# Patient Record
Sex: FEMALE | Race: WHITE | NOT HISPANIC OR LATINO | Employment: FULL TIME | URBAN - METROPOLITAN AREA
[De-identification: names, ages, dates, MRNs, and addresses within clinical notes are randomized per-mention and may not be internally consistent; named-entity substitution may affect disease eponyms.]

---

## 2017-04-18 ENCOUNTER — APPOINTMENT (OUTPATIENT)
Dept: LAB | Facility: CLINIC | Age: 27
End: 2017-04-18
Payer: COMMERCIAL

## 2017-04-18 ENCOUNTER — ALLSCRIPTS OFFICE VISIT (OUTPATIENT)
Dept: OTHER | Facility: OTHER | Age: 27
End: 2017-04-18

## 2017-04-18 ENCOUNTER — TRANSCRIBE ORDERS (OUTPATIENT)
Dept: LAB | Facility: CLINIC | Age: 27
End: 2017-04-18

## 2017-04-18 ENCOUNTER — GENERIC CONVERSION - ENCOUNTER (OUTPATIENT)
Dept: OTHER | Facility: OTHER | Age: 27
End: 2017-04-18

## 2017-04-18 DIAGNOSIS — Z34.90 PRENATAL CARE, UNSPECIFIED TRIMESTER: Primary | ICD-10-CM

## 2017-04-18 DIAGNOSIS — Z34.90 ENCOUNTER FOR SUPERVISION OF NORMAL PREGNANCY: ICD-10-CM

## 2017-04-18 LAB
ABO GROUP BLD: NORMAL
BASOPHILS # BLD AUTO: 0.01 THOUSANDS/ΜL (ref 0–0.1)
BASOPHILS NFR BLD AUTO: 0 % (ref 0–1)
BILIRUB UR QL STRIP: NEGATIVE
BLD GP AB SCN SERPL QL: NEGATIVE
CLARITY UR: CLEAR
COLOR UR: YELLOW
EOSINOPHIL # BLD AUTO: 0.02 THOUSAND/ΜL (ref 0–0.61)
EOSINOPHIL NFR BLD AUTO: 0 % (ref 0–6)
ERYTHROCYTE [DISTWIDTH] IN BLOOD BY AUTOMATED COUNT: 12.1 % (ref 11.6–15.1)
GLUCOSE UR STRIP-MCNC: NEGATIVE MG/DL
HCT VFR BLD AUTO: 42.7 % (ref 34.8–46.1)
HGB BLD-MCNC: 14.4 G/DL (ref 11.5–15.4)
HGB UR QL STRIP.AUTO: NEGATIVE
KETONES UR STRIP-MCNC: NEGATIVE MG/DL
LEUKOCYTE ESTERASE UR QL STRIP: NEGATIVE
LYMPHOCYTES # BLD AUTO: 1.12 THOUSANDS/ΜL (ref 0.6–4.47)
LYMPHOCYTES NFR BLD AUTO: 12 % (ref 14–44)
MCH RBC QN AUTO: 31.7 PG (ref 26.8–34.3)
MCHC RBC AUTO-ENTMCNC: 33.7 G/DL (ref 31.4–37.4)
MCV RBC AUTO: 94 FL (ref 82–98)
MONOCYTES # BLD AUTO: 0.92 THOUSAND/ΜL (ref 0.17–1.22)
MONOCYTES NFR BLD AUTO: 10 % (ref 4–12)
NEUTROPHILS # BLD AUTO: 7.6 THOUSANDS/ΜL (ref 1.85–7.62)
NEUTS SEG NFR BLD AUTO: 78 % (ref 43–75)
NITRITE UR QL STRIP: NEGATIVE
PH UR STRIP.AUTO: 6.5 [PH] (ref 4.5–8)
PLATELET # BLD AUTO: 247 THOUSANDS/UL (ref 149–390)
PMV BLD AUTO: 10.9 FL (ref 8.9–12.7)
PROT UR STRIP-MCNC: NEGATIVE MG/DL
RBC # BLD AUTO: 4.54 MILLION/UL (ref 3.81–5.12)
RH BLD: POSITIVE
RUBV IGG SERPL IA-ACNC: 48.9 IU/ML
SP GR UR STRIP.AUTO: <=1.005 (ref 1–1.03)
SPECIMEN EXPIRATION DATE: NORMAL
UROBILINOGEN UR QL STRIP.AUTO: 0.2 E.U./DL
WBC # BLD AUTO: 9.67 THOUSAND/UL (ref 4.31–10.16)

## 2017-04-18 PROCEDURE — 81003 URINALYSIS AUTO W/O SCOPE: CPT

## 2017-04-18 PROCEDURE — 87086 URINE CULTURE/COLONY COUNT: CPT

## 2017-04-18 PROCEDURE — 86777 TOXOPLASMA ANTIBODY: CPT

## 2017-04-18 PROCEDURE — 86778 TOXOPLASMA ANTIBODY IGM: CPT

## 2017-04-18 PROCEDURE — 80307 DRUG TEST PRSMV CHEM ANLYZR: CPT

## 2017-04-18 PROCEDURE — 86787 VARICELLA-ZOSTER ANTIBODY: CPT

## 2017-04-18 PROCEDURE — 80081 OBSTETRIC PANEL INC HIV TSTG: CPT

## 2017-04-18 PROCEDURE — 36415 COLL VENOUS BLD VENIPUNCTURE: CPT

## 2017-04-19 LAB
BACTERIA UR CULT: NORMAL
CLINICAL COMMENT: NORMAL
HBV SURFACE AG SER QL: NORMAL
HIV 1+2 AB+HIV1 P24 AG SERPL QL IA: NORMAL
RPR SER QL: NORMAL
T GONDII IGG SERPL IA-ACNC: <3 IU/ML (ref 0–7.1)
T GONDII IGM SER IA-ACNC: <3 AU/ML (ref 0–7.9)

## 2017-04-20 LAB — VZV IGG SER IA-ACNC: NORMAL

## 2017-04-24 ENCOUNTER — HOSPITAL ENCOUNTER (OUTPATIENT)
Dept: ULTRASOUND IMAGING | Facility: HOSPITAL | Age: 27
Discharge: HOME | End: 2017-04-24
Attending: OBSTETRICS & GYNECOLOGY
Payer: COMMERCIAL

## 2017-04-24 DIAGNOSIS — Z34.90 PRENATAL CARE, UNSPECIFIED TRIMESTER: ICD-10-CM

## 2017-04-24 LAB
AMPHETAMINES UR QL SCN: NEGATIVE NG/ML
BARBITURATES UR QL SCN: NEGATIVE NG/ML
BENZODIAZ UR QL SCN: NEGATIVE NG/ML
BZE UR QL SCN: NEGATIVE NG/ML
CANNABINOIDS UR QL SCN: POSITIVE
METHADONE UR QL SCN: NEGATIVE NG/ML
OPIATES UR QL: NEGATIVE NG/ML
PCP UR QL: NEGATIVE NG/ML
PROPOXYPH UR QL: NEGATIVE NG/ML

## 2017-04-24 PROCEDURE — 76801 OB US < 14 WKS SINGLE FETUS: CPT

## 2017-04-26 ENCOUNTER — GENERIC CONVERSION - ENCOUNTER (OUTPATIENT)
Dept: OTHER | Facility: OTHER | Age: 27
End: 2017-04-26

## 2017-05-05 ENCOUNTER — GENERIC CONVERSION - ENCOUNTER (OUTPATIENT)
Dept: OTHER | Facility: OTHER | Age: 27
End: 2017-05-05

## 2017-05-24 ENCOUNTER — GENERIC CONVERSION - ENCOUNTER (OUTPATIENT)
Dept: OTHER | Facility: OTHER | Age: 27
End: 2017-05-24

## 2017-05-24 ENCOUNTER — LAB REQUISITION (OUTPATIENT)
Dept: LAB | Facility: HOSPITAL | Age: 27
End: 2017-05-24
Payer: COMMERCIAL

## 2017-05-24 DIAGNOSIS — Z34.90 ENCOUNTER FOR SUPERVISION OF NORMAL PREGNANCY: ICD-10-CM

## 2017-05-24 DIAGNOSIS — Z11.3 ENCOUNTER FOR SCREENING FOR INFECTIONS WITH PREDOMINANTLY SEXUAL MODE OF TRANSMISSION: ICD-10-CM

## 2017-05-24 PROCEDURE — 87591 N.GONORRHOEAE DNA AMP PROB: CPT | Performed by: OBSTETRICS & GYNECOLOGY

## 2017-05-24 PROCEDURE — 87491 CHLMYD TRACH DNA AMP PROBE: CPT | Performed by: OBSTETRICS & GYNECOLOGY

## 2017-05-26 LAB
CHLAMYDIA DNA CVX QL NAA+PROBE: NORMAL
N GONORRHOEA DNA GENITAL QL NAA+PROBE: NORMAL

## 2017-06-20 ENCOUNTER — GENERIC CONVERSION - ENCOUNTER (OUTPATIENT)
Dept: OTHER | Facility: OTHER | Age: 27
End: 2017-06-20

## 2017-06-20 DIAGNOSIS — Z87.898 PERSONAL HISTORY OF OTHER SPECIFIED CONDITIONS: ICD-10-CM

## 2017-07-06 ENCOUNTER — APPOINTMENT (OUTPATIENT)
Dept: LAB | Facility: CLINIC | Age: 27
End: 2017-07-06
Payer: COMMERCIAL

## 2017-07-06 ENCOUNTER — TRANSCRIBE ORDERS (OUTPATIENT)
Dept: LAB | Facility: CLINIC | Age: 27
End: 2017-07-06

## 2017-07-06 DIAGNOSIS — Z87.898 PERSONAL HISTORY OF OTHER SPECIFIED CONDITIONS: ICD-10-CM

## 2017-07-06 PROCEDURE — 80307 DRUG TEST PRSMV CHEM ANLYZR: CPT

## 2017-07-08 LAB
AMPHETAMINES UR QL SCN: NEGATIVE NG/ML
BARBITURATES UR QL SCN: NEGATIVE NG/ML
BENZODIAZ UR QL SCN: NEGATIVE NG/ML
BZE UR QL SCN: NEGATIVE NG/ML
CANNABINOIDS UR QL SCN: NEGATIVE NG/ML
METHADONE UR QL SCN: NEGATIVE NG/ML
OPIATES UR QL: NEGATIVE NG/ML
PCP UR QL: NEGATIVE NG/ML
PROPOXYPH UR QL: NEGATIVE NG/ML

## 2017-07-18 ENCOUNTER — ALLSCRIPTS OFFICE VISIT (OUTPATIENT)
Dept: OTHER | Facility: OTHER | Age: 27
End: 2017-07-18

## 2017-07-18 ENCOUNTER — GENERIC CONVERSION - ENCOUNTER (OUTPATIENT)
Dept: OTHER | Facility: OTHER | Age: 27
End: 2017-07-18

## 2017-08-01 ENCOUNTER — ALLSCRIPTS OFFICE VISIT (OUTPATIENT)
Dept: PERINATAL CARE | Facility: CLINIC | Age: 27
End: 2017-08-01
Payer: COMMERCIAL

## 2017-08-01 ENCOUNTER — GENERIC CONVERSION - ENCOUNTER (OUTPATIENT)
Dept: OTHER | Facility: OTHER | Age: 27
End: 2017-08-01

## 2017-08-01 PROCEDURE — 76817 TRANSVAGINAL US OBSTETRIC: CPT | Performed by: OBSTETRICS & GYNECOLOGY

## 2017-08-01 PROCEDURE — 76805 OB US >/= 14 WKS SNGL FETUS: CPT | Performed by: OBSTETRICS & GYNECOLOGY

## 2017-08-17 ENCOUNTER — GENERIC CONVERSION - ENCOUNTER (OUTPATIENT)
Dept: OTHER | Facility: OTHER | Age: 27
End: 2017-08-17

## 2017-09-13 ENCOUNTER — ALLSCRIPTS OFFICE VISIT (OUTPATIENT)
Dept: OTHER | Facility: OTHER | Age: 27
End: 2017-09-13

## 2017-09-13 ENCOUNTER — GENERIC CONVERSION - ENCOUNTER (OUTPATIENT)
Dept: OTHER | Facility: OTHER | Age: 27
End: 2017-09-13

## 2017-09-13 DIAGNOSIS — R73.09 OTHER ABNORMAL GLUCOSE: ICD-10-CM

## 2017-09-13 DIAGNOSIS — Z34.02 ENCOUNTER FOR SUPERVISION OF NORMAL FIRST PREGNANCY IN SECOND TRIMESTER: ICD-10-CM

## 2017-09-22 ENCOUNTER — TRANSCRIBE ORDERS (OUTPATIENT)
Dept: LAB | Facility: CLINIC | Age: 27
End: 2017-09-22

## 2017-09-22 ENCOUNTER — APPOINTMENT (OUTPATIENT)
Dept: LAB | Facility: CLINIC | Age: 27
End: 2017-09-22
Payer: COMMERCIAL

## 2017-09-22 DIAGNOSIS — Z34.02 ENCOUNTER FOR SUPERVISION OF NORMAL FIRST PREGNANCY IN SECOND TRIMESTER: ICD-10-CM

## 2017-09-22 LAB
BASOPHILS # BLD AUTO: 0.02 THOUSANDS/ΜL (ref 0–0.1)
BASOPHILS NFR BLD AUTO: 0 % (ref 0–1)
EOSINOPHIL # BLD AUTO: 0.03 THOUSAND/ΜL (ref 0–0.61)
EOSINOPHIL NFR BLD AUTO: 0 % (ref 0–6)
ERYTHROCYTE [DISTWIDTH] IN BLOOD BY AUTOMATED COUNT: 12.3 % (ref 11.6–15.1)
GLUCOSE 1H P 50 G GLC PO SERPL-MCNC: 136 MG/DL
HCT VFR BLD AUTO: 34.2 % (ref 34.8–46.1)
HGB BLD-MCNC: 11.4 G/DL (ref 11.5–15.4)
LYMPHOCYTES # BLD AUTO: 0.89 THOUSANDS/ΜL (ref 0.6–4.47)
LYMPHOCYTES NFR BLD AUTO: 8 % (ref 14–44)
MCH RBC QN AUTO: 30.6 PG (ref 26.8–34.3)
MCHC RBC AUTO-ENTMCNC: 33.3 G/DL (ref 31.4–37.4)
MCV RBC AUTO: 92 FL (ref 82–98)
MONOCYTES # BLD AUTO: 0.94 THOUSAND/ΜL (ref 0.17–1.22)
MONOCYTES NFR BLD AUTO: 9 % (ref 4–12)
NEUTROPHILS # BLD AUTO: 9.06 THOUSANDS/ΜL (ref 1.85–7.62)
NEUTS SEG NFR BLD AUTO: 83 % (ref 43–75)
PLATELET # BLD AUTO: 225 THOUSANDS/UL (ref 149–390)
PMV BLD AUTO: 9.9 FL (ref 8.9–12.7)
RBC # BLD AUTO: 3.72 MILLION/UL (ref 3.81–5.12)
WBC # BLD AUTO: 10.94 THOUSAND/UL (ref 4.31–10.16)

## 2017-09-22 PROCEDURE — 82950 GLUCOSE TEST: CPT

## 2017-09-22 PROCEDURE — 36415 COLL VENOUS BLD VENIPUNCTURE: CPT

## 2017-09-22 PROCEDURE — 85025 COMPLETE CBC W/AUTO DIFF WBC: CPT

## 2017-09-25 ENCOUNTER — GENERIC CONVERSION - ENCOUNTER (OUTPATIENT)
Dept: OTHER | Facility: OTHER | Age: 27
End: 2017-09-25

## 2017-09-26 ENCOUNTER — ALLSCRIPTS OFFICE VISIT (OUTPATIENT)
Dept: OTHER | Facility: OTHER | Age: 27
End: 2017-09-26

## 2017-09-26 ENCOUNTER — GENERIC CONVERSION - ENCOUNTER (OUTPATIENT)
Dept: OTHER | Facility: OTHER | Age: 27
End: 2017-09-26

## 2017-09-29 ENCOUNTER — APPOINTMENT (OUTPATIENT)
Dept: LAB | Facility: CLINIC | Age: 27
End: 2017-09-29
Payer: COMMERCIAL

## 2017-09-29 DIAGNOSIS — R73.09 OTHER ABNORMAL GLUCOSE: ICD-10-CM

## 2017-09-29 LAB
GLUCOSE 1H P 100 G GLC PO SERPL-MCNC: 157 MG/DL (ref 65–179)
GLUCOSE 2H P 100 G GLC PO SERPL-MCNC: 142 MG/DL (ref 65–154)
GLUCOSE 3H P 100 G GLC PO SERPL-MCNC: 112 MG/DL (ref 65–139)
GLUCOSE P FAST SERPL-MCNC: 88 MG/DL (ref 65–99)

## 2017-09-29 PROCEDURE — 36415 COLL VENOUS BLD VENIPUNCTURE: CPT

## 2017-09-29 PROCEDURE — 82948 REAGENT STRIP/BLOOD GLUCOSE: CPT

## 2017-09-29 PROCEDURE — 82951 GLUCOSE TOLERANCE TEST (GTT): CPT

## 2017-09-29 PROCEDURE — 82952 GTT-ADDED SAMPLES: CPT

## 2017-10-04 LAB — GLUCOSE SERPL-MCNC: 82 MG/DL (ref 65–140)

## 2017-10-05 ENCOUNTER — GENERIC CONVERSION - ENCOUNTER (OUTPATIENT)
Dept: OTHER | Facility: OTHER | Age: 27
End: 2017-10-05

## 2017-10-10 ENCOUNTER — GENERIC CONVERSION - ENCOUNTER (OUTPATIENT)
Dept: OTHER | Facility: OTHER | Age: 27
End: 2017-10-10

## 2017-10-25 ENCOUNTER — GENERIC CONVERSION - ENCOUNTER (OUTPATIENT)
Dept: OTHER | Facility: OTHER | Age: 27
End: 2017-10-25

## 2017-11-07 ENCOUNTER — GENERIC CONVERSION - ENCOUNTER (OUTPATIENT)
Dept: OTHER | Facility: OTHER | Age: 27
End: 2017-11-07

## 2017-11-21 ENCOUNTER — GENERIC CONVERSION - ENCOUNTER (OUTPATIENT)
Dept: OTHER | Facility: OTHER | Age: 27
End: 2017-11-21

## 2017-11-22 ENCOUNTER — LAB REQUISITION (OUTPATIENT)
Dept: LAB | Facility: HOSPITAL | Age: 27
End: 2017-11-22
Payer: COMMERCIAL

## 2017-11-22 DIAGNOSIS — Z34.83 ENCOUNTER FOR SUPERVISION OF OTHER NORMAL PREGNANCY, THIRD TRIMESTER: ICD-10-CM

## 2017-11-22 PROCEDURE — 87653 STREP B DNA AMP PROBE: CPT | Performed by: OBSTETRICS & GYNECOLOGY

## 2017-11-23 LAB — GP B STREP DNA SPEC QL NAA+PROBE: NORMAL

## 2017-11-29 ENCOUNTER — GENERIC CONVERSION - ENCOUNTER (OUTPATIENT)
Dept: OTHER | Facility: OTHER | Age: 27
End: 2017-11-29

## 2017-12-05 ENCOUNTER — GENERIC CONVERSION - ENCOUNTER (OUTPATIENT)
Dept: OTHER | Facility: OTHER | Age: 27
End: 2017-12-05

## 2017-12-11 ENCOUNTER — GENERIC CONVERSION - ENCOUNTER (OUTPATIENT)
Dept: OTHER | Facility: OTHER | Age: 27
End: 2017-12-11

## 2017-12-13 ENCOUNTER — HOSPITAL ENCOUNTER (OUTPATIENT)
Facility: HOSPITAL | Age: 27
Discharge: HOME | End: 2017-12-13
Attending: OBSTETRICS & GYNECOLOGY | Admitting: OBSTETRICS & GYNECOLOGY
Payer: COMMERCIAL

## 2017-12-13 ENCOUNTER — GENERIC CONVERSION - ENCOUNTER (OUTPATIENT)
Dept: OTHER | Facility: OTHER | Age: 27
End: 2017-12-13

## 2017-12-13 VITALS
WEIGHT: 176 LBS | HEART RATE: 88 BPM | TEMPERATURE: 98.4 F | SYSTOLIC BLOOD PRESSURE: 124 MMHG | DIASTOLIC BLOOD PRESSURE: 87 MMHG | HEIGHT: 64 IN | RESPIRATION RATE: 20 BRPM | BODY MASS INDEX: 30.05 KG/M2

## 2017-12-13 LAB
ALBUMIN SERPL BCP-MCNC: 2.4 G/DL (ref 3.5–5)
ALP SERPL-CCNC: 135 U/L (ref 46–116)
ALT SERPL W P-5'-P-CCNC: 13 U/L (ref 12–78)
ANION GAP SERPL CALCULATED.3IONS-SCNC: 8 MMOL/L (ref 4–13)
AST SERPL W P-5'-P-CCNC: 15 U/L (ref 5–45)
BACTERIA UR QL AUTO: NORMAL /HPF
BASOPHILS # BLD AUTO: 0.01 THOUSANDS/ΜL (ref 0–0.1)
BASOPHILS NFR BLD AUTO: 0 % (ref 0–1)
BILIRUB SERPL-MCNC: 0.27 MG/DL (ref 0.2–1)
BILIRUB UR QL STRIP: NEGATIVE
BUN SERPL-MCNC: 4 MG/DL (ref 5–25)
CALCIUM SERPL-MCNC: 8.9 MG/DL (ref 8.3–10.1)
CHLORIDE SERPL-SCNC: 108 MMOL/L (ref 100–108)
CLARITY UR: CLEAR
CO2 SERPL-SCNC: 20 MMOL/L (ref 21–32)
COLOR UR: YELLOW
CREAT SERPL-MCNC: 0.34 MG/DL (ref 0.6–1.3)
CREAT UR-MCNC: 30.6 MG/DL
EOSINOPHIL # BLD AUTO: 0.02 THOUSAND/ΜL (ref 0–0.61)
EOSINOPHIL NFR BLD AUTO: 0 % (ref 0–6)
ERYTHROCYTE [DISTWIDTH] IN BLOOD BY AUTOMATED COUNT: 13.2 % (ref 11.6–15.1)
GFR SERPL CREATININE-BSD FRML MDRD: 152 ML/MIN/1.73SQ M
GLUCOSE SERPL-MCNC: 71 MG/DL (ref 65–140)
GLUCOSE UR STRIP-MCNC: NEGATIVE MG/DL
HCT VFR BLD AUTO: 35.1 % (ref 34.8–46.1)
HGB BLD-MCNC: 12.1 G/DL (ref 11.5–15.4)
HGB UR QL STRIP.AUTO: ABNORMAL
HYALINE CASTS #/AREA URNS LPF: NORMAL /LPF
KETONES UR STRIP-MCNC: NEGATIVE MG/DL
LEUKOCYTE ESTERASE UR QL STRIP: NEGATIVE
LYMPHOCYTES # BLD AUTO: 1.06 THOUSANDS/ΜL (ref 0.6–4.47)
LYMPHOCYTES NFR BLD AUTO: 8 % (ref 14–44)
MCH RBC QN AUTO: 30.8 PG (ref 26.8–34.3)
MCHC RBC AUTO-ENTMCNC: 34.5 G/DL (ref 31.4–37.4)
MCV RBC AUTO: 89 FL (ref 82–98)
MONOCYTES # BLD AUTO: 0.97 THOUSAND/ΜL (ref 0.17–1.22)
MONOCYTES NFR BLD AUTO: 8 % (ref 4–12)
NEUTROPHILS # BLD AUTO: 10.83 THOUSANDS/ΜL (ref 1.85–7.62)
NEUTS SEG NFR BLD AUTO: 84 % (ref 43–75)
NITRITE UR QL STRIP: NEGATIVE
NON-SQ EPI CELLS URNS QL MICRO: NORMAL /HPF
NRBC BLD AUTO-RTO: 0 /100 WBCS
PH UR STRIP.AUTO: 7.5 [PH] (ref 4.5–8)
PLATELET # BLD AUTO: 199 THOUSANDS/UL (ref 149–390)
PMV BLD AUTO: 10.7 FL (ref 8.9–12.7)
POTASSIUM SERPL-SCNC: 3.6 MMOL/L (ref 3.5–5.3)
PROT SERPL-MCNC: 6.2 G/DL (ref 6.4–8.2)
PROT UR STRIP-MCNC: NEGATIVE MG/DL
PROT UR-MCNC: 12 MG/DL
PROT/CREAT UR: 0.39 MG/G{CREAT} (ref 0–0.1)
RBC # BLD AUTO: 3.93 MILLION/UL (ref 3.81–5.12)
RBC #/AREA URNS AUTO: NORMAL /HPF
SODIUM SERPL-SCNC: 136 MMOL/L (ref 136–145)
SP GR UR STRIP.AUTO: 1 (ref 1–1.03)
UROBILINOGEN UR QL STRIP.AUTO: 0.2 E.U./DL
WBC # BLD AUTO: 12.97 THOUSAND/UL (ref 4.31–10.16)
WBC #/AREA URNS AUTO: NORMAL /HPF

## 2017-12-13 PROCEDURE — 80053 COMPREHEN METABOLIC PANEL: CPT | Performed by: OBSTETRICS & GYNECOLOGY

## 2017-12-13 PROCEDURE — 99213 OFFICE O/P EST LOW 20 MIN: CPT

## 2017-12-13 PROCEDURE — 82570 ASSAY OF URINE CREATININE: CPT | Performed by: OBSTETRICS & GYNECOLOGY

## 2017-12-13 PROCEDURE — 85025 COMPLETE CBC W/AUTO DIFF WBC: CPT | Performed by: OBSTETRICS & GYNECOLOGY

## 2017-12-13 PROCEDURE — 81001 URINALYSIS AUTO W/SCOPE: CPT | Performed by: OBSTETRICS & GYNECOLOGY

## 2017-12-13 PROCEDURE — 84156 ASSAY OF PROTEIN URINE: CPT | Performed by: OBSTETRICS & GYNECOLOGY

## 2017-12-13 NOTE — PROGRESS NOTES
Triage Note - OB  Sue Campuzano 33 y o  female MRN: [id number]  Unit/Bed#: LD Triage 4 Encounter: [id number]    Chief Complaint:   Chief Complaint   Patient presents with   Ratna Fontanezer     sent from office/has complaint of frontal headache since 0 this am     EFRAIN: Estimated Date of Delivery: 17    HPI: 33 y o  female  at 36w3d presents for r/o pre-eclampsia  She was seen in the office today and was c/o a frontal headache, spotty vision, and RUQ discomfortant  However, while speaking with the patient in [facility] she states she has a history of migraine headache and the spotty vision occurred prior to onset of headache, reported as blurry vision in periphery  She also has numbness in her fingers right before the onset of headache  She states the headache has mostly subsided and denies any changes in vision, chest pain, sob, RUQ/epigastric pain, or sudden onset of increased swelling in face and hands  BP in tirage have been 120s-130s/80s  Denies vaginal bleeding, LOF, or ctx  She had her membranes stripped in office today    Vitals: Pulse 103, temperature 98 4 °F (36 9 °C), temperature source Oral, resp  rate 20, height 5' 4" (1 626 m), weight 79 8 kg (176 lb), currently breastfeeding  ,Body mass index is 30 21 kg/m²      Physical Exam  GEN: well developed and well nourished, alert, oriented times 3 and appears comfortable  Abd: soft, non tender and gravid  SVE: deferred    FHR: 120bpm, moderate variability, positive accels, no decels, reactive  Padroni: q3-5 with irritability    Labs:   Admission on 2017   Component Date Value    WBC 2017 12 97*    RBC 2017 3 93     Hemoglobin 2017 12 1     Hematocrit 2017 35 1     MCV 2017 89     MCH 2017 30 8     MCHC 2017 34 5     RDW 2017 13 2     MPV 2017 10 7     Platelets  199     nRBC 2017 0     Neutrophils Relative 2017 84*    Lymphocytes Relative 2017 8*    Monocytes Relative 12/13/2017 8     Eosinophils Relative 12/13/2017 0     Basophils Relative 12/13/2017 0     Neutrophils Absolute 12/13/2017 10 83*    Lymphocytes Absolute 12/13/2017 1 06     Monocytes Absolute 12/13/2017 0 97     Eosinophils Absolute 12/13/2017 0 02     Basophils Absolute 12/13/2017 0 01      Recent Results (from the past 12 hour(s))   CBC and differential    Collection Time: 12/13/17  6:05 PM   Result Value Ref Range    WBC 12 97 (H) 4 31 - 10 16 Thousand/uL    RBC 3 93 3 81 - 5 12 Million/uL    Hemoglobin 12 1 11 5 - 15 4 g/dL    Hematocrit 35 1 34 8 - 46 1 %    MCV 89 82 - 98 fL    MCH 30 8 26 8 - 34 3 pg    MCHC 34 5 31 4 - 37 4 g/dL    RDW 13 2 11 6 - 15 1 %    MPV 10 7 8 9 - 12 7 fL    Platelets 990 513 - 439 Thousands/uL    nRBC 0 /100 WBCs    Neutrophils Relative 84 (H) 43 - 75 %    Lymphocytes Relative 8 (L) 14 - 44 %    Monocytes Relative 8 4 - 12 %    Eosinophils Relative 0 0 - 6 %    Basophils Relative 0 0 - 1 %    Neutrophils Absolute 10 83 (H) 1 85 - 7 62 Thousands/µL    Lymphocytes Absolute 1 06 0 60 - 4 47 Thousands/µL    Monocytes Absolute 0 97 0 17 - 1 22 Thousand/µL    Eosinophils Absolute 0 02 0 00 - 0 61 Thousand/µL    Basophils Absolute 0 01 0 00 - 0 10 Thousands/µL   Comprehensive metabolic panel    Collection Time: 12/13/17  6:05 PM   Result Value Ref Range    Sodium 136 136 - 145 mmol/L    Potassium 3 6 3 5 - 5 3 mmol/L    Chloride 108 100 - 108 mmol/L    CO2 20 (L) 21 - 32 mmol/L    Anion Gap 8 4 - 13 mmol/L    BUN 4 (L) 5 - 25 mg/dL    Creatinine 0 34 (L) 0 60 - 1 30 mg/dL    Glucose 71 65 - 140 mg/dL    Calcium 8 9 8 3 - 10 1 mg/dL    AST 15 5 - 45 U/L    ALT 13 12 - 78 U/L    Alkaline Phosphatase 135 (H) 46 - 116 U/L    Total Protein 6 2 (L) 6 4 - 8 2 g/dL    Albumin 2 4 (L) 3 5 - 5 0 g/dL    Total Bilirubin 0 27 0 20 - 1 00 mg/dL    eGFR 152 ml/min/1 73sq m   UA (URINE) with reflex to Microscopic    Collection Time: 12/13/17  6:05 PM   Result Value Ref Range Color, UA Yellow     Clarity, UA Clear     Specific [place], UA 1 005 1 003 - 1 030    pH, UA 7 5 4 5 - 8 0    Leukocytes, UA Negative Negative    Nitrite, UA Negative Negative    Protein, UA Negative Negative mg/dl    Glucose, UA Negative Negative mg/dl    Ketones, UA Negative Negative mg/dl    Urobilinogen, UA 0 2 0 2, 1 0 E U /dl E U /dl    Bilirubin, UA Negative Negative    Blood, UA Small (A) Negative   Protein / creatinine ratio, urine    Collection Time: 17  6:05 PM   Result Value Ref Range    Creatinine, Ur 30 6 mg/dL    Protein Urine Random 12 mg/dL    Prot/Creat Ratio, Ur 0 39 (H) 0 00 - 0 10   Urine Microscopic    Collection Time: 17  6:05 PM   Result Value Ref Range    RBC, UA None Seen None Seen, 0-5 /hpf    WBC, UA None Seen None Seen, 0-5, 5-55, 5-65 /hpf    Epithelial Cells Occasional None Seen, Occasional /hpf    Bacteria, UA None Seen None Seen, Occasional /hpf    Hyaline Casts, UA None Seen None Seen /lpf         Lab, Imaging and other studies: I have personally reviewed pertinent reports  A/P: 33 y o  female  at 36w3d for r/o pre-clampsia  1)CBC  CMP, U/A, Urine protein/creatinine ratio: see above, pr/cr ratio 0 39  Monitor Bps: 120s-130s/80s  Pt asymptomatic, Headache resolved  2)Proteinuria  3) Discharge instructions given to patient and labor precautions reviewed  Pt advised to f/u in office on Friday      D/w Dr Justin Chambers MD  OBGYN, PGY-1  2017 7:53 PM

## 2017-12-14 NOTE — DISCHARGE INSTRUCTIONS
Preeclampsia   WHAT YOU NEED TO KNOW:   What is preeclampsia? Preeclampsia is a condition that can develop during week 20 or later of your pregnancy  Preeclampsia means you have high blood pressure and may have protein in your urine  Preeclampsia can cause mild to life-threatening health problems for you and your unborn baby  What are the signs and symptoms of preeclampsia? You may not have any symptoms  Severe preeclampsia may cause any of the following symptoms:  · Swollen face and hands    · A sudden weight gain of 5 pounds or more    · Headache    · Spotted or blurred vision     · Pain in your upper abdomen  What increases my risk for preeclampsia? · First pregnancy    · Pregnant with twins or multiples    · Personal or family history of preeclampsia or eclampsia    · Overweight    · Diabetes, high blood pressure, or kidney disease    · Age older than 40 years  How is preeclampsia diagnosed? · A blood pressure  of 140/90 mmHg or more for at least 2 readings may mean you have preeclampsia  Your blood pressure will need to be checked 1 to 2 times a week until your baby is born  · Blood tests  are done to check your liver and kidney function  You may need blood tests every week while you are pregnant  · Urine tests  are used to check for protein  You may need to give healthcare providers a urine sample at each visit  You may also need to collect your urine every time you urinate for 24 hours  How will my unborn baby be monitored? You may need to keep track of how often your baby moves or kicks over a certain amount of time  Ask your healthcare provider how to do kick counts and how often to do them  You may also need the following tests at each visit until your baby is born:  · A fetal biophysical profile  combines a nonstress test and an ultrasound of your unborn baby  The nonstress test measures changes in your baby's heartbeat when he moves   The ultrasound will show your baby's movement, growth, and how his breathing muscles are working  Healthcare providers can check the amount of fluid around your baby  The ultrasound will also show how well your baby's lungs are working  · An umbilical cord Doppler  checks blood flow through the umbilical cord  How is preeclampsia treated? · Medicines  may be given to lower your blood pressure, protect your organs, or prevent seizures  Low doses of aspirin after 12 weeks of pregnancy may be recommended if you are at high risk for preeclampsia  Aspirin may help prevent preeclampsia or problems that can happen from preeclampsia  Do not take aspirin unless directed by your healthcare provider  · Rest  as directed  Your healthcare provider may tell you to rest more often if you have mild symptoms of preeclampsia  Lie on your left side as often as you can  You may need complete bedrest if you have more severe symptoms  You may need to be in the hospital if your condition worsens  · Delivery  usually stops preeclampsia  Healthcare providers may deliver your baby right away if he is full-term (37 weeks or more)  He may need to be delivered early if you or the baby has life-threatening symptoms  What are the risks of preeclampsia? Your baby may not grow as he should and may need to be delivered early  Placental abruption can occur if the placenta pulls away from the uterus too soon  This condition is life-threatening for your baby  High blood pressure that is not controlled can lead to blood clots, kidney or liver failure, or stroke  Severe preeclampsia can cause seizures or coma  This condition is called eclampsia  Eclampsia is a life-threatening condition for you and your unborn baby  Call 911 for any of the following:   · You have a seizure  · You have severe abdominal pain with nausea and vomiting  When should I seek immediate care? · You develop a severe headache that does not go away      · You have blurred or spotted vision that does not go away     · You are bleeding from your vagina  · You have new or increased swelling in your face or hands  · You are urinating little or not at all  When should I contact my healthcare provider? · You are urinating less than usual      · You do not feel your baby's movements as often as usual     · You have questions or concerns about your condition or care  CARE AGREEMENT:   You have the right to help plan your care  Learn about your health condition and how it may be treated  Discuss treatment options with your caregivers to decide what care you want to receive  You always have the right to refuse treatment  The above information is an  only  It is not intended as medical advice for individual conditions or treatments  Talk to your doctor, nurse or pharmacist before following any medical regimen to see if it is safe and effective for you  © 2017 [address] [employer] Information is for End User's use only and may not be sold, redistributed or otherwise used for commercial purposes  All illustrations and images included in CareNotes® are the copyrighted property of [employer]  or [employer]

## 2017-12-18 ENCOUNTER — ANESTHESIA (INPATIENT)
Dept: LABOR AND DELIVERY | Facility: HOSPITAL | Age: 27
End: 2017-12-18
Payer: COMMERCIAL

## 2017-12-18 ENCOUNTER — HOSPITAL ENCOUNTER (INPATIENT)
Facility: HOSPITAL | Age: 27
LOS: 2 days | Discharge: HOME | End: 2017-12-20
Attending: OBSTETRICS & GYNECOLOGY | Admitting: OBSTETRICS & GYNECOLOGY
Payer: COMMERCIAL

## 2017-12-18 ENCOUNTER — ANESTHESIA EVENT (INPATIENT)
Dept: LABOR AND DELIVERY | Facility: HOSPITAL | Age: 27
End: 2017-12-18
Payer: COMMERCIAL

## 2017-12-18 DIAGNOSIS — Z3A.39 39 WEEKS GESTATION OF PREGNANCY: Primary | ICD-10-CM

## 2017-12-18 DIAGNOSIS — R80.9 PROTEINURIA: ICD-10-CM

## 2017-12-18 PROBLEM — O42.90 PREMATURE RUPTURE OF MEMBRANES: Status: ACTIVE | Noted: 2017-12-18

## 2017-12-18 LAB
ABO GROUP BLD: NORMAL
AMPHETAMINES SERPL QL SCN: NEGATIVE
BARBITURATES UR QL: NEGATIVE
BASOPHILS # BLD AUTO: 0.02 THOUSANDS/ΜL (ref 0–0.1)
BASOPHILS NFR BLD AUTO: 0 % (ref 0–1)
BENZODIAZ UR QL: NEGATIVE
BLD GP AB SCN SERPL QL: NEGATIVE
COCAINE UR QL: NEGATIVE
EOSINOPHIL # BLD AUTO: 0.03 THOUSAND/ΜL (ref 0–0.61)
EOSINOPHIL NFR BLD AUTO: 0 % (ref 0–6)
ERYTHROCYTE [DISTWIDTH] IN BLOOD BY AUTOMATED COUNT: 13.4 % (ref 11.6–15.1)
HCT VFR BLD AUTO: 35.8 % (ref 34.8–46.1)
HGB BLD-MCNC: 12.4 G/DL (ref 11.5–15.4)
LYMPHOCYTES # BLD AUTO: 0.89 THOUSANDS/ΜL (ref 0.6–4.47)
LYMPHOCYTES NFR BLD AUTO: 7 % (ref 14–44)
MCH RBC QN AUTO: 31.2 PG (ref 26.8–34.3)
MCHC RBC AUTO-ENTMCNC: 34.6 G/DL (ref 31.4–37.4)
MCV RBC AUTO: 90 FL (ref 82–98)
METHADONE UR QL: NEGATIVE
MONOCYTES # BLD AUTO: 1.06 THOUSAND/ΜL (ref 0.17–1.22)
MONOCYTES NFR BLD AUTO: 8 % (ref 4–12)
NEUTROPHILS # BLD AUTO: 10.5 THOUSANDS/ΜL (ref 1.85–7.62)
NEUTS SEG NFR BLD AUTO: 85 % (ref 43–75)
NRBC BLD AUTO-RTO: 0 /100 WBCS
OPIATES UR QL SCN: NEGATIVE
PCP UR QL: NEGATIVE
PLATELET # BLD AUTO: 214 THOUSANDS/UL (ref 149–390)
PMV BLD AUTO: 11.3 FL (ref 8.9–12.7)
RBC # BLD AUTO: 3.98 MILLION/UL (ref 3.81–5.12)
RH BLD: POSITIVE
SPECIMEN EXPIRATION DATE: NORMAL
THC UR QL: NEGATIVE
WBC # BLD AUTO: 12.58 THOUSAND/UL (ref 4.31–10.16)

## 2017-12-18 PROCEDURE — 86592 SYPHILIS TEST NON-TREP QUAL: CPT | Performed by: OBSTETRICS & GYNECOLOGY

## 2017-12-18 PROCEDURE — 86901 BLOOD TYPING SEROLOGIC RH(D): CPT | Performed by: OBSTETRICS & GYNECOLOGY

## 2017-12-18 PROCEDURE — 99213 OFFICE O/P EST LOW 20 MIN: CPT

## 2017-12-18 PROCEDURE — 85025 COMPLETE CBC W/AUTO DIFF WBC: CPT | Performed by: OBSTETRICS & GYNECOLOGY

## 2017-12-18 PROCEDURE — 86850 RBC ANTIBODY SCREEN: CPT | Performed by: OBSTETRICS & GYNECOLOGY

## 2017-12-18 PROCEDURE — 86900 BLOOD TYPING SEROLOGIC ABO: CPT | Performed by: OBSTETRICS & GYNECOLOGY

## 2017-12-18 PROCEDURE — 80307 DRUG TEST PRSMV CHEM ANLYZR: CPT | Performed by: STUDENT IN AN ORGANIZED HEALTH CARE EDUCATION/TRAINING PROGRAM

## 2017-12-18 RX ORDER — BUTORPHANOL TARTRATE 1 MG/ML
1 INJECTION, SOLUTION INTRAMUSCULAR; INTRAVENOUS ONCE
Status: COMPLETED | OUTPATIENT
Start: 2017-12-18 | End: 2017-12-18

## 2017-12-18 RX ORDER — OXYTOCIN/RINGER'S LACTATE 30/500 ML
1-30 PLASTIC BAG, INJECTION (ML) INTRAVENOUS
Status: DISCONTINUED | OUTPATIENT
Start: 2017-12-18 | End: 2017-12-19

## 2017-12-18 RX ORDER — ONDANSETRON 2 MG/ML
4 INJECTION INTRAMUSCULAR; INTRAVENOUS EVERY 6 HOURS PRN
Status: DISCONTINUED | OUTPATIENT
Start: 2017-12-18 | End: 2017-12-19

## 2017-12-18 RX ORDER — SODIUM CHLORIDE, SODIUM LACTATE, POTASSIUM CHLORIDE, CALCIUM CHLORIDE 600; 310; 30; 20 MG/100ML; MG/100ML; MG/100ML; MG/100ML
125 INJECTION, SOLUTION INTRAVENOUS CONTINUOUS
Status: DISCONTINUED | OUTPATIENT
Start: 2017-12-18 | End: 2017-12-19

## 2017-12-18 RX ADMIN — SODIUM CHLORIDE, SODIUM LACTATE, POTASSIUM CHLORIDE, AND CALCIUM CHLORIDE 125 ML/HR: .6; .31; .03; .02 INJECTION, SOLUTION INTRAVENOUS at 19:47

## 2017-12-18 RX ADMIN — Medication 11 MILLI-UNITS/MIN: at 23:21

## 2017-12-18 RX ADMIN — BUTORPHANOL TARTRATE 1 MG: 1 INJECTION, SOLUTION INTRAMUSCULAR; INTRAVENOUS at 19:50

## 2017-12-18 RX ADMIN — SODIUM CHLORIDE, SODIUM LACTATE, POTASSIUM CHLORIDE, AND CALCIUM CHLORIDE 125 ML/HR: .6; .31; .03; .02 INJECTION, SOLUTION INTRAVENOUS at 21:13

## 2017-12-18 RX ADMIN — Medication 2 MILLI-UNITS/MIN: at 16:43

## 2017-12-18 RX ADMIN — ROPIVACAINE HYDROCHLORIDE: 2 INJECTION, SOLUTION EPIDURAL; INFILTRATION at 21:14

## 2017-12-18 RX ADMIN — ONDANSETRON 4 MG: 2 INJECTION INTRAMUSCULAR; INTRAVENOUS at 19:46

## 2017-12-18 RX ADMIN — SODIUM CHLORIDE, SODIUM LACTATE, POTASSIUM CHLORIDE, AND CALCIUM CHLORIDE 125 ML/HR: .6; .31; .03; .02 INJECTION, SOLUTION INTRAVENOUS at 15:25

## 2017-12-18 NOTE — H&P
H & P- Obstetrics   Laurence Goldman 33 y o  female MRN: [id number]  Unit/Bed#: LD Triage 4 Encounter: [id number]    Assessment: 33 y o   at 39w6d admitted for induction of labor for premature rupture of membranes  SROM @ 1030, clear, grossly ruptured on examination  SVE 2-3/60/-2  Category I FHT, Irritability on tocometer  A + / GBS negative  Clinical EFW 3200g  History notable for anxiety disorder w/ history of suicide attempt    Plan:   · Admit  · CBC, RPR, Type & Screen  · Pitocin titration for labor induction  · Analgesia at maternal request  · Consider postpartum case management consult for social history    Dr Pau Gupta aware      Chief Complaint: leakage of fluid    HPI: Laurence Goldman is a 33 y o   with an EFRAIN of 2017, by Ultrasound at 39w6d who is being admitted for premature rupture of membrane  The patient reports leakage of fluid since 1030, clear  She denies contractions or vaginal bleeding  She reports good fetal movement  OB history notable for: Elevated 1hr GTT (136) with normal 3hour ; Proteinuria diagnosed 17 in Triage, when patient initially presented for headache & found to have P:C0 39, no elevated blood pressures  Past medical history notable for asthma & anxiety disorder with history of suicide attempt  Patient Active Problem List   Diagnosis    Premature rupture of membranes    39 weeks gestation of pregnancy    Proteinuria       Baby complications/comments: none    Review of Systems   Constitutional: Negative for chills and fever  Eyes: Negative for visual disturbance  Respiratory: Negative for shortness of breath  Cardiovascular: Negative for chest pain, palpitations and leg swelling  Gastrointestinal: Negative for abdominal pain, diarrhea, nausea and vomiting  Genitourinary: Negative for dysuria and flank pain  Neurological: Negative for dizziness and headaches         OB History    Para Term  AB Living   1             SAB TAB Ectopic Multiple Live Births                  # Outcome Date GA Lbr Olivier/2nd Weight Sex Delivery Anes PTL Lv   1 Current                   Past Medical History:   Diagnosis Date    Anxiety     Asthma     Chronic kidney disease     stones    Migraine     Mood disorder (HCC)     Panic anxiety syndrome     Suicide attempt     Varicella     childhood       Past Surgical History:   Procedure Laterality Date    SHOULDER SURGERY         Social History   Substance Use Topics    Smoking status: Former Smoker    Smokeless tobacco: Never Used    Alcohol use No       No Known Allergies    Prescriptions Prior to Admission   Medication    Prenatal Vit-Fe Fumarate-FA (PRENATAL VITAMIN PO)       Objective:  Temp:  [98 8 °F (37 1 °C)] 98 8 °F (37 1 °C)  HR:  [108] 108  Resp:  [20] 20  BP: (128)/(86) 128/86  Body mass index is 30 21 kg/m²       Physical Exam:  Gen: NAD, AAOx3, Pleasant & Cooperative  Cv: RRR, No M/R/G  Resp: CTAB, No W/R/R  Abdomen: Gravid, non-tender  Clinical EFW: 3200g  Ext: Symmetric, non-tender    SVE:  Dilation: 2-3  Effacement (%): 60  Station: -1  Cervical Characteristics: Mid-position, Moderate    FHT:  Baseline Rate: 125 bpm  Variability: Moderate 6-25 bpm  Accelerations: 15 x 15 or greater, With fetal movment, At variable times  Decelerations: None  FHR Category: Category I    TOCO:   Contraction Frequency (minutes): 3-4  Contraction Duration (seconds): 60-80  Contraction Quality: Mild    Lab Results   Component Value Date    WBC 12 58 (H) 12/18/2017    HGB 12 4 12/18/2017    HCT 35 8 12/18/2017     12/18/2017     Lab Results   Component Value Date     12/13/2017    K 3 6 12/13/2017     12/13/2017    CO2 20 (L) 12/13/2017    BUN 4 (L) 12/13/2017    CREATININE 0 34 (L) 12/13/2017    GLUCOSE 71 12/13/2017    AST 15 12/13/2017    ALT 13 12/13/2017     Prenatal Labs: reviewed     Blood type: A positive  Antibody: Negative  GBS: Negative  HIV: Negative  Rubella: Immune  VDRL/RPR: Non reactive  HBsAg: Negative  Chlamydia: Negative  Gonorrhea: Negative  Diabetes 1 hour screen: 136  Diabetes 3 hour screen: 88/157/142/112  Platelets: 027  Hgb: 12 4  >2 Midnights  INPATIENT     Signature/Title: Rolando Perez MD  Date: 12/18/2017  Time: 3:43 PM

## 2017-12-19 PROBLEM — O14.00 MILD PRE-ECLAMPSIA: Status: ACTIVE | Noted: 2017-12-18

## 2017-12-19 LAB
BASE EXCESS BLDCOA CALC-SCNC: -5.3 MMOL/L (ref 3–11)
BASE EXCESS BLDCOV CALC-SCNC: -3.7 MMOL/L (ref 1–9)
HCO3 BLDCOA-SCNC: 22.1 MMOL/L (ref 17.3–27.3)
HCO3 BLDCOV-SCNC: 20.5 MMOL/L (ref 12.2–28.6)
O2 CT VFR BLDCOA CALC: 14.7 ML/DL
OXYHGB MFR BLDCOA: 59.9 %
OXYHGB MFR BLDCOV: 79.6 %
PCO2 BLDCOA: 49.4 MM[HG] (ref 30–60)
PCO2 BLDCOV: 35.2 MM HG (ref 27–43)
PH BLDCOA: 7.27 [PH] (ref 7.23–7.43)
PH BLDCOV: 7.38 [PH] (ref 7.19–7.49)
PO2 BLDCOA: 26.8 MM HG (ref 5–25)
PO2 BLDCOV: 34.6 MM HG (ref 15–45)
RPR SER QL: NORMAL
SAO2 % BLDCOV: 18.9 ML/DL

## 2017-12-19 PROCEDURE — 0KQM0ZZ REPAIR PERINEUM MUSCLE, OPEN APPROACH: ICD-10-PCS | Performed by: OBSTETRICS & GYNECOLOGY

## 2017-12-19 PROCEDURE — 82805 BLOOD GASES W/O2 SATURATION: CPT | Performed by: OBSTETRICS & GYNECOLOGY

## 2017-12-19 RX ORDER — DOCUSATE SODIUM 100 MG/1
100 CAPSULE, LIQUID FILLED ORAL 2 TIMES DAILY
Status: DISCONTINUED | OUTPATIENT
Start: 2017-12-19 | End: 2017-12-20 | Stop reason: HOSPADM

## 2017-12-19 RX ORDER — DIAPER,BRIEF,INFANT-TODD,DISP
1 EACH MISCELLANEOUS AS NEEDED
Status: DISCONTINUED | OUTPATIENT
Start: 2017-12-19 | End: 2017-12-20 | Stop reason: HOSPADM

## 2017-12-19 RX ORDER — ONDANSETRON 2 MG/ML
4 INJECTION INTRAMUSCULAR; INTRAVENOUS EVERY 8 HOURS PRN
Status: DISCONTINUED | OUTPATIENT
Start: 2017-12-19 | End: 2017-12-20 | Stop reason: HOSPADM

## 2017-12-19 RX ORDER — OXYTOCIN/RINGER'S LACTATE 30/500 ML
250 PLASTIC BAG, INJECTION (ML) INTRAVENOUS CONTINUOUS
Status: ACTIVE | OUTPATIENT
Start: 2017-12-19 | End: 2017-12-19

## 2017-12-19 RX ORDER — SIMETHICONE 80 MG
80 TABLET,CHEWABLE ORAL 4 TIMES DAILY PRN
Status: DISCONTINUED | OUTPATIENT
Start: 2017-12-19 | End: 2017-12-20 | Stop reason: HOSPADM

## 2017-12-19 RX ORDER — IBUPROFEN 600 MG/1
600 TABLET ORAL EVERY 6 HOURS PRN
Status: DISCONTINUED | OUTPATIENT
Start: 2017-12-19 | End: 2017-12-20 | Stop reason: HOSPADM

## 2017-12-19 RX ORDER — DIPHENHYDRAMINE HCL 25 MG
25 TABLET ORAL EVERY 6 HOURS PRN
Status: DISCONTINUED | OUTPATIENT
Start: 2017-12-19 | End: 2017-12-20 | Stop reason: HOSPADM

## 2017-12-19 RX ORDER — OXYCODONE HYDROCHLORIDE AND ACETAMINOPHEN 5; 325 MG/1; MG/1
2 TABLET ORAL EVERY 4 HOURS PRN
Status: DISCONTINUED | OUTPATIENT
Start: 2017-12-19 | End: 2017-12-20 | Stop reason: HOSPADM

## 2017-12-19 RX ORDER — OXYCODONE HYDROCHLORIDE AND ACETAMINOPHEN 5; 325 MG/1; MG/1
1 TABLET ORAL EVERY 4 HOURS PRN
Status: DISCONTINUED | OUTPATIENT
Start: 2017-12-19 | End: 2017-12-20 | Stop reason: HOSPADM

## 2017-12-19 RX ORDER — ACETAMINOPHEN 325 MG/1
650 TABLET ORAL EVERY 6 HOURS PRN
Status: DISCONTINUED | OUTPATIENT
Start: 2017-12-19 | End: 2017-12-20 | Stop reason: HOSPADM

## 2017-12-19 RX ORDER — OXYTOCIN/RINGER'S LACTATE 30/500 ML
62.5 PLASTIC BAG, INJECTION (ML) INTRAVENOUS CONTINUOUS
Status: ACTIVE | OUTPATIENT
Start: 2017-12-19 | End: 2017-12-19

## 2017-12-19 RX ADMIN — DOCUSATE SODIUM 100 MG: 100 CAPSULE, LIQUID FILLED ORAL at 08:47

## 2017-12-19 RX ADMIN — Medication 62.5 MILLI-UNITS/MIN: at 04:21

## 2017-12-19 RX ADMIN — BENZOCAINE AND MENTHOL: 20; .5 SPRAY TOPICAL at 06:16

## 2017-12-19 RX ADMIN — IBUPROFEN 600 MG: 600 TABLET ORAL at 18:38

## 2017-12-19 RX ADMIN — WITCH HAZEL 1 PAD: 500 SOLUTION RECTAL; TOPICAL at 06:16

## 2017-12-19 RX ADMIN — DOCUSATE SODIUM 100 MG: 100 CAPSULE, LIQUID FILLED ORAL at 18:39

## 2017-12-19 RX ADMIN — IBUPROFEN 600 MG: 600 TABLET ORAL at 11:01

## 2017-12-19 RX ADMIN — Medication 62.5 MILLI-UNITS/MIN: at 05:01

## 2017-12-19 RX ADMIN — OXYCODONE HYDROCHLORIDE AND ACETAMINOPHEN 1 TABLET: 5; 325 TABLET ORAL at 23:33

## 2017-12-19 RX ADMIN — SODIUM CHLORIDE, SODIUM LACTATE, POTASSIUM CHLORIDE, AND CALCIUM CHLORIDE 125 ML/HR: .6; .31; .03; .02 INJECTION, SOLUTION INTRAVENOUS at 01:55

## 2017-12-19 NOTE — OB LABOR/OXYTOCIN SAFETY PROGRESS
Oxytocin Safety Progress Check Note - Harsh Sood 33 y o  female MRN: [id number]    Unit/Bed#: -01 Encounter: [id number]    Obstetric History       T0      L0     SAB0   TAB0   Ectopic0   Multiple0   Live Births0      Gestational Age: 37w0d  Dose (christa-units/min) Oxytocin: 12 christa-units/min  Contraction Frequency (minutes): 1 5-2  Contraction Quality: Strong  Tachysystole: No   Dilation: 10        Effacement (%): 100  Station: 1  Baseline Rate: 150 bpm  Fetal Heart Rate: 140 BPM  FHR Category: Category I     Oxytocin Safety Progress Check: Safety check completed    Notes/comments:   Patient feeling pressure  Will begin pushing    Position: Serge Pacheco MD 2017 12:30 AM

## 2017-12-19 NOTE — L&D DELIVERY NOTE
Obstetrician:  Dr Rui Mccoy  Assistant: Dr Nain Iverson    Pre-Delivery Diagnosis:   Term pregnancy  Single fetus  PROM  Maternal Exhaustion      Post-Delivery Diagnosis:  Same as above - Delivered by vacuum assisted vaginal delivery    Procedure:   1  vacuum extraction delivery  2  Repair of none and 2nd degree    Cord Gas:  Arterial pH/BE: 7 268/-5 3  Venous pH/BE: 7 383/-3 7    Indications for instrumental delivery:  Prolonged second stage of labor  Shortening of the second stage for maternal benefit, maternal exhaustion      Anesthesia: epidural    Estimated Blood Loss: 243    Complications:  None    Brief History and Labor Course:    Dee Severin is a 33 y o   at 40w0d weeks gestation who was admitted for SROM  Please see labor timeline for complete labor course  The patient was completely dilated at 00:30  She began to push at 00:17 and at 00:38, decision was made for patient to labor down for one hour  At 01:45, Patient began to push again  She had made progress in the second stage from +1 to +2/+3, and fetus was noted to be in OA position  After pushing from 01:45 until 03:30, patient began to express concerns about exhaustion  At this point we have discussed the different options of continuing pushing, vacuum extraction,  section  We have discussed risks and benefits of all, and risks of vacuum included maternal trauma to the vagina  , fetal scalp lacerations, increased risk of hemorrhage in the fetal brain, increased bruising on the fetal scalp  We had also discussed that if a vacuum extraction had the abandoned, she would still need a  section which may be more difficult with the that point with the fetal head at the +3 station  Patient and  had time to discuss, and decided to pursue a vacuum attempt a vaginal delivery  We had discussed the with on the maternal for in order to have successful delivery    Fetal weight was estimated to be 7 and half to 8 lb, with adequate room in the pelvis  Description of Delivery:   Due to Shortening of the second stage for maternal benefit for maternal exhaustion, vacuum assisted delivery was recommended after pushing for one hour and 45 minutes   The pt was consented verbally and agreed  NICU was notified  The fetus was noted to be in the OA position, +3-4 station  EFW 8  Bladder was drained  Anesthesia was adequate  The Kiwi cup was applied to the fetal median flexion point and centered over the sagittal sutures approx 3 cm anterior to the posterior fontanelle  There was no vaginal or cervical tissue at the margin  With the start of the next contraction, a vacuum seal was established to a max of 600mmHg and gentle traction was applied  Advancement of the fetal head was noted with gentle traction on the suction device  The vacuum was applied at 03:33 hours  1  3 pulls were performed  2  0 number of pop-offs occurred  3  0 number of re applications were performed  The vacuum was released upon  of the fetal head and delivery was performed thereafter  There was no nuchal chord  With the assistance of maternal expulsive efforts and downward traction of fetal head, the anterior shoulder was delivered without difficulty, followed by the remainder of the infant's body  The infant was placed on the mother's abdomen and the umbilical cord was doubly clamped and cut  The awaiting nurse resuscitator was there for further evaluation of the infant  Umbilical cord blood and umbilical artery and venous gases were collected  IV pitocin was started during the 3rd stage to control bleeding  Placenta was delivered with fundal massage and gentle traction on the cord with active management of the third stage of labor  Placenta delivered intact with a peripherally inserted 3-vessel cord  A bimanual was performed and the uterus was noted to be firm and contracted      Inspection of the perineum, vagina, labia, and urethra revealed none and 2nd degree  The laceration was repaired with 3-0  vicryl on a CT needle, using a separate stitch of the 2-0 vicryl suture for the crown stitch  Good hemostasis was noted  During delivery and repair, EBL was noted to 500cc  Cervix was visualized and noted to be hemostatic and fundus was firm  Pitocin was run wide, and patient will receive another bag of pitocin at 62 5cc/hour    Mother and baby are currently recovering in the LDR in stable condition  I was present for and participated in all key portions of the procedure  All sponge, needle and instrument counts were noted to be correct x2

## 2017-12-19 NOTE — DISCHARGE INSTRUCTIONS
Vaginal Delivery   WHAT YOU SHOULD KNOW:   A vaginal delivery is the birth of your baby through your vagina (birth canal)  AFTER YOU LEAVE:   Medicines:  · NSAIDs  help decrease swelling and pain or fever  This medicine is available with or without a doctor's order  NSAIDs can cause stomach bleeding or kidney problems in certain people  If you take blood thinner medicine, always ask your healthcare provider if NSAIDs are safe for you  Always read the medicine label and follow directions  · Take your medicine as directed  Call your healthcare provider if you think your medicine is not helping or if you have side effects  Tell him if you are allergic to any medicine  Keep a list of the medicines, vitamins, and herbs you take  Include the amounts, and when and why you take them  Bring the list or the pill bottles to follow-up visits  Carry your medicine list with you in case of an emergency  Follow up with your primary healthcare provider:  Most women need to return 6 weeks after a vaginal delivery  Ask about how to care for your wounds or stitches  Write down your questions so you remember to ask them during your visits  Activity:  Rest as much as possible  Try to keep all activities short  You may be able to do some exercise soon after you have your baby  Talk with your primary healthcare provider before you start exercising  If you work outside the home, ask when you can return to your job  Kegel exercises:  Kegel exercises may help your vaginal and rectal muscles heal faster  You can do Kegel exercises by tightening and relaxing the muscles around your vagina  Kegel exercises help make the muscles stronger  Breast care:  When your milk comes in, your breasts may feel full and hard  Ask how to care for your breasts, even if you are not breastfeeding  Constipation:  Do not try to push the bowel movement out if it is too hard   High-fiber foods, extra liquids, and regular exercise can help you prevent constipation  Examples of high-fiber foods are fruit and bran  Prune juice and water are good liquids to drink  Regular exercise helps your digestive system work  You may also be told to take over-the-counter fiber and stool softener medicines  Take these items as directed  Hemorrhoids:  Pregnancy can cause severe hemorrhoids  You may have rectal pain because of the hemorrhoids  Ask how to prevent or treat hemorrhoids  Perineum care: Your perineum is the area between your vagina and anus  Keep the area clean and dry to help it heal and to prevent infection  Wash the area gently with soap and water when you bathe or shower  Rinse your perineum with warm water when you use the toilet  Your primary healthcare provider may suggest you use a warm sitz bath to help decrease pain  A sitz bath is a bathtub or basin filled to hip level  Stay in the sitz bath for 20 to 30 minutes, or as directed  Vaginal discharge: You will have vaginal discharge, called lochia, after your delivery  The lochia is bright red the first day or two after the birth  By the fourth day, the amount decreases, and it turns red-brown  Use a sanitary pad rather than a tampon to prevent a vaginal infection  It is normal to have lochia up to 8 weeks after your baby is born  Monthly periods: Your period may start again within 7 to 12 weeks after your baby is born  If you are breastfeeding, it may take longer for your period to start again  You can still get pregnant again even though you do not have your monthly period  Talk with your primary healthcare provider about a birth control method that will be good for you if you do not want to get pregnant  Mood changes: Many new mothers have some kind of mood changes after delivery  Some of these changes occur because of lack of sleep, hormone changes, and caring for a new baby  Some mood changes can be more serious, such as postpartum depression   Talk with your primary healthcare provider if you feel unable to care for yourself or your baby  Sexual activity:  You may need to avoid sex for 6 to 7 weeks after you have your baby  You may notice you have a decreased desire for sex, or sex may be painful  You may need to use a vaginal lubricant (gel) to help make sex more comfortable  Contact your primary healthcare provider if:   · You have heavy vaginal bleeding that fills 1 or more sanitary pads in 1 hour  · You have a fever  · Your pain does not go away, or gets worse  · The skin between your vagina and rectum is swollen, warm, or red  · You have swollen, hard, or painful breasts  · You feel very sad or depressed  · You feel more tired than usual      · You have questions or concerns about your condition or care  Seek care immediately or call 911 if:   · You have pus or yellow drainage coming from your vagina or wound  · You are urinating very little, or not at all  · Your arm or leg feels warm, tender, and painful  It may look swollen and red  · You feel lightheaded, have sudden and worsening chest pain, or trouble breathing  You may have more pain when you take deep breaths or cough, or you may cough up blood  © 2014 [address] is for End User's use only and may not be sold, redistributed or otherwise used for commercial purposes  All illustrations and images included in CareNotes® are the copyrighted property of [employer]  or [employer]  The above information is an  only  It is not intended as medical advice for individual conditions or treatments  Talk to your doctor, nurse or pharmacist before following any medical regimen to see if it is safe and effective for you

## 2017-12-19 NOTE — ANESTHESIA PROCEDURE NOTES
Epidural Block    Patient location during procedure: OB  Start time: 12/18/2017 8:10 PM  Reason for block: procedure for pain, at surgeon's request and primary anesthetic  Staffing  Anesthesiologist: Sheila Crawford  Performed: anesthesiologist   Preanesthetic Checklist  Completed: patient identified, site marked, surgical consent, pre-op evaluation, timeout performed, IV checked, risks and benefits discussed and monitors and equipment checked  Epidural  Patient position: sitting  Prep: Betadine and site prepped and draped  Patient monitoring: continuous pulse ox and frequent blood pressure checks  Approach: midline  Location: lumbar (1-5)  Injection technique: FLETCHER air  Needle  Needle type: Tuohy   Needle gauge: 18 G  Catheter at skin depth: 9 cm  Test dose: negative and lidocaine 1 5% with epinephrine 1-to-200,000 (3ml)negative aspiration for CSF, negative aspiration for heme and no paresthesia on injection  patient tolerated the procedure well with no immediate complications  Additional Notes  Total 5ml lido 1 5% with 1:200K epi given with good effect

## 2017-12-19 NOTE — ANESTHESIA PREPROCEDURE EVALUATION
Review of Systems/Medical History    Chart reviewed  No history of anesthetic complications     Cardiovascular  Negative cardio ROS    Pulmonary  Asthma: seasonal/exercise induced , ,        GI/Hepatic    GERD ,        Kidney stones,        Endo/Other  Negative endo/other ROS      GYN  Currently pregnant ,          Hematology    No coagulation disorder ,    Musculoskeletal       Neurology   Psychology   Anxiety,            Physical Exam    Airway    Mallampati score: III  TM Distance: >3 FB  Neck ROM: full     Dental   No notable dental hx     Cardiovascular  Comment: Negative ROS,     Pulmonary      Other Findings        Anesthesia Plan  ASA Score- 2       Anesthesia Type- epidural with ASA Monitors  Additional Monitors:   Airway Plan:     Comment: Labor epidural, GA/spinal as needed  Induction- intravenous  Informed Consent- Anesthetic plan and risks discussed with patient

## 2017-12-19 NOTE — PROGRESS NOTES
Pt states she is very tired  Pt and dr Cy Beavers encouraged patient to labor down for one hour then restart pushing  Pt agrees to rest and labor down

## 2017-12-19 NOTE — OB LABOR/OXYTOCIN SAFETY PROGRESS
Oxytocin Safety Progress Check Note - Laurence Goldman 33 y o  female MRN: [id number]    Unit/Bed#: -01 Encounter: [id number]    Obstetric History       T0      L0     SAB0   TAB0   Ectopic0   Multiple0   Live Births0      Gestational Age: 37w11d  Dose (christa-units/min) Oxytocin: 11 christa-units/min  Contraction Frequency (minutes): 1 5-2 5  Contraction Quality: Strong  Tachysystole: No   Dilation: 9        Effacement (%): 100  Station: -1  Baseline Rate: 130 bpm  Fetal Heart Rate: 140 BPM  FHR Category: Category I     Oxytocin Safety Progress Check: Safety check completed    Notes/comments:   Patient comfortable s/p epidural   Continue management  Will recheck in 2 hours  Frequent position changes    Fetal position ROT          Yolanda Franks MD 2017 11:32 PM

## 2017-12-19 NOTE — LACTATION NOTE
This note was copied from a baby's chart  CONSULT - LACTATION  Baby Girl Stephanie Richarda 0 days female MRN: [id number]    [address] [place] [address] Room / Bed: (N)/(N) Encounter: [id number]    Maternal Information     MOTHER:  Barbie Bennett  Maternal Age: 33 y o    OB History: #: 1, Date: 17, Sex: Female, Weight: 3600 g (7 lb 15 oz), GA: 40w0d, Delivery: Vaginal, Vacuum (Extractor), Apgar1: 9, Apgar5: 9, Living: Living, Birth Comments: None       Lactation history:   Has patient previously breast fed: No   How long had patient previously breast fed:     Previous breast feeding complications:       Past Surgical History:   Procedure Laterality Date    DENTAL SURGERY N/A     SHOULDER SURGERY         Birth information:  YOB: 2016   Time of birth: 3:41 AM   Sex: female   Delivery type: Vaginal, Vacuum (Extractor)   Birth Weight: 3600 g (7 lb 15 oz)   Percent of Weight Change: 0%     Gestational Age: 37w0d   [unfilled]    Assessment     Breast and nipple assessment: nipple retracts into brast tissue at times     Assessment: normal assessment    Feeding assessment: latch difficulty (will attempt different methods of latching to find a way that infant can maintain latch for at least 15 minutes )  LATCH:  Latch: Grasps breast, tongue down, lips flanged, rhythmic sucking   Audible Swallowing: Spontaneous and intermittent (24 hours old)   Type of Nipple: Flat   Comfort (Breast/Nipple): Soft/non-tender   Hold (Positioning): Full assist, teach one side, mother does other, staff holds   [facility] Score: 8          Feeding recommendations:  breast feed on demand     Met with mother  Provided mother with Ready, Set, Baby booklet  Discussed Skin to Skin contact an benefits to mom and baby  Talked about the delay of the first bath until baby has adjusted  Spoke about the benefits of rooming in   Feeding on cue and what that means for recognizing infant's hunger  Avoidance of pacifiers for the first month discussed  Talked about exclusive breastfeeding for the first 6 months  Positioning and latch reviewed as well as showing images of other feeding positions  Discussed the properties of a good latch in any position  Reviewed hand/manual expression  Discussed s/s that baby is getting enough milk and some s/s that breastfeeding dyad may need further help  Gave information on common concerns, what to expect the first few weeks after delivery, preparing for other caregivers, and how partners can help  Resources for support also provided  Information on hand expression given  Discussed benefits of knowing how to manually express breast including stimulating milk supply, softening nipple for latch and evacuating breast in the event of engorgement  Encouraged MOB to call for assistance, questions, and concerns about breastfeeding  Extension provided      Aurelia Cheney RN 12/19/2017 12:58 PM

## 2017-12-19 NOTE — ANESTHESIA POSTPROCEDURE EVALUATION
Post-Op Assessment Note      CV Status:  Stable    Mental Status:  Alert    Hydration Status:  Stable    PONV Controlled:  None    Airway Patency:  Patent    Post Op Vitals Reviewed: Yes          Staff: Anesthesiologist       Comments: effects of epidural resolving appropriately, pt satisfied with care    Post-op block assessment: no complications        BP      Temp      Pulse    Resp      SpO2

## 2017-12-19 NOTE — DISCHARGE SUMMARY
Discharge Summary - Silvia Clark 33 y o  female MRN: [id number]    Unit/Bed#: -01 Encounter: [id number]    Admission Date: 2017     Discharge Date: 2017    Admitting Diagnosis:   1  Pregnancy at 40w0d  2  PROM  3  Asthma  4  Anxiety/depression with hx of suicide attempt    Discharge Diagnosis: same, delivered  5  Preeclampsia without severe features    Procedures: vacuum, outlet    Attending: Leatrice Osler, MD    Hospital Course:     Silvia Clark is a 33 y o   at 40w0d wks who was initially admitted for premature rupture of membranes  She was started on pitocin and progressed well  She received an epidural for analgesia  She progressed to complete  Secondary to maternal exhaustion with explusive efforts, decision was made to proceed with vacuum assisted delivery  She delivered a viable female  on 17 at   Weight  7lbs 15 oz via vacuum, outlet  Apgars were 9 (1 min) and 9 (5 min)   was transferred to  nursery  Patient tolerated the procedure well and was transferred to recovery in stable condition  Her post-partum course was complicated by preeclampsia without severe features  She has had new-onset hypertension <160/110 without symptoms  She had a documented protein:creatinine ratio from an L&D triage visit 17  Post partum blood pressure were within normal range  Her post-partum pain was well controlled with oral analgesics  On day of discharge, she was ambulating and able to reasonably perform all ADLs  She was voiding and had appropriate bowel function  Pain was well controlled  She was discharged home on post-partum day 1  without complications  Patient was instructed to follow up with her OB as an outpatient and was given appropriate warnings to call provider if she develops signs of infection or uncontrolled pain   Patient with h/o of anxiety and suicide attempt but currently denying suicidal or homicidal ideations pending case management consult  Complications: none apparent    Condition at discharge: good     Discharge instructions/Information to patient and family:   See after visit summary for information provided to patient and family  Provisions for Follow-Up Care:  See after visit summary for information related to follow-up care and any pertinent home health orders        Disposition: Home    Planned Readmission: No

## 2017-12-19 NOTE — SOCIAL WORK
Consults for "H/o depression w/ previous suicide attempt " and "Hx THC < THC  Mom UDS neg  Baby urine pending "     Per chart, mom and baby UDS negative  This is pt's 1st child  Per chart review, pt took 6 pills of  025mg Xanax (her own Rx) in suicide attempt on 8/5/15 after the death of her grandmother 7/5/15 and then her father kicked her out of the house that day  Pt was admitted to [facility] for treatment  Attempted to speak with pt but she was sleeping as she just delivered the baby this morning  CM to follow up

## 2017-12-19 NOTE — PROGRESS NOTES
Pt sitting up on edge of bed ultrasound removed unable to obtain accurate fetal heart tracing due to maternal position

## 2017-12-19 NOTE — OB LABOR/OXYTOCIN SAFETY PROGRESS
Oxytocin Safety Progress Check Note - Unique Bourne 33 y o  female MRN: [id number]    Unit/Bed#: -01 Encounter: [id number]    Obstetric History       T0      L0     SAB0   TAB0   Ectopic0   Multiple0   Live Births0      Gestational Age: 37w11d  Dose (christa-units/min) Oxytocin: 6 christa-units/min  Contraction Frequency (minutes): 3  Contraction Quality: Moderate  Tachysystole: No   Dilation: 5        Effacement (%): 80  Station: -1  Baseline Rate: 125 bpm  Fetal Heart Rate: 159 BPM  FHR Category: Category I     Oxytocin Safety Progress Check: Safety check completed    Notes/comments:   Patient still feels discomfort from blanchard, but otherwise feels comfortable from epidural    FHT has been category 1  She is making cervical change  Will recheck in two hours    D/W Dr Miguel Collier MD 2017 9:23 PM

## 2017-12-20 VITALS
TEMPERATURE: 98.3 F | DIASTOLIC BLOOD PRESSURE: 84 MMHG | RESPIRATION RATE: 20 BRPM | BODY MASS INDEX: 30.05 KG/M2 | HEART RATE: 86 BPM | WEIGHT: 176 LBS | HEIGHT: 64 IN | SYSTOLIC BLOOD PRESSURE: 118 MMHG

## 2017-12-20 RX ORDER — ACETAMINOPHEN 325 MG/1
325 TABLET ORAL EVERY 6 HOURS PRN
Qty: 30 TABLET | Refills: 0
Start: 2017-12-20 | End: 2018-02-12 | Stop reason: ALTCHOICE

## 2017-12-20 RX ORDER — IBUPROFEN 600 MG/1
600 TABLET ORAL EVERY 6 HOURS PRN
Qty: 30 TABLET | Refills: 0
Start: 2017-12-20 | End: 2018-02-12 | Stop reason: ALTCHOICE

## 2017-12-20 RX ADMIN — DOCUSATE SODIUM 100 MG: 100 CAPSULE, LIQUID FILLED ORAL at 08:11

## 2017-12-20 RX ADMIN — IBUPROFEN 600 MG: 600 TABLET ORAL at 10:33

## 2017-12-20 NOTE — SOCIAL WORK
CSWS spoke with patient as a follow up to CM attempted contact with patient yesterday  Patient has a history of a suicide attempt in 2015, after her grandparent's passing  She reports at the time she was depressed as well  She was admitted to [facility] for inpatient behavioral health after the suicide attempt  Since this time patient has not had suicidal thoughts  She denies depression or anxiety  She is not in current treatment  She has never seen outpatient mental health providers  She denies substance abuse issues  She states before she was pregnant she smoked marijuana  CSWS updated RN of the above information  Patient is to be discharged today

## 2017-12-20 NOTE — PLAN OF CARE
ALTERATION IN THE BREAST     Optimize infant feeding at the breast Progressing        DISCHARGE PLANNING     Discharge to home or other facility with appropriate resources Progressing        INADEQUATE LATCH, SUCK OR SWALLOW     Demonstrate ability to latch and sustain latch, audible swallowing and satiety Progressing        INFECTION - ADULT     Absence or prevention of progression during hospitalization Progressing     Absence of fever/infection during neutropenic period Progressing        Knowledge Deficit     Verbalizes understanding of labor plan Progressing     Patient/family/caregiver demonstrates understanding of disease process, treatment plan, medications, and discharge instructions Progressing        Labor & Delivery     Manages discomfort Progressing     Patient vital signs are stable Progressing        PAIN - ADULT     Verbalizes/displays adequate comfort level or baseline comfort level Progressing        SAFETY ADULT     Patient will remain free of falls Progressing     Maintain or return to baseline ADL function Progressing     Maintain or return mobility status to optimal level Progressing

## 2017-12-20 NOTE — LACTATION NOTE
This note was copied from a baby's chart  Met with mother to go over feeding log since birth for the first week  Emphasized 8 or more (12) feedings in a 24 hour period, what to expect for the number of diapers per day of life and the progression of properties of the  stooling pattern  Discussed s/s that breastfeeding is going well after day 4 and when to get help from a pediatrician or lactation support person after day 4  Booklet included Breast Pumping Instructions, When You Go Back to Work or School, and Breastfeeding Resources for after discharge including access to the number for the [employer]  C/O sore nipples  Information given about sore nipples and how to correct with positioning techniques  Discussed maneuvers to latch infant on properly to avoid nipple pain and promote healing  Discussed treatments that could be utilized to promote healing  Hydro gel dressings given with instruction and verbalization of understanding of cleaning and duration of use  Encoraged MOB and FOB to call for assistance, questions and concerns  Extension number for inpatient lactation support provided

## 2017-12-20 NOTE — PROGRESS NOTES
Progress Note - OB/GYN   Namita Santiago 33 y o  female MRN: [id number]  Unit/Bed#: -01 Encounter: [id number]    Assessment:  33 y o   s/p Vacuum Vaginal Delivery Postpartum day  1  Preeclampsia w/o Sf: -140s/60-90s  Asthma- stable  H/o depression/ anxiety and previous suicide attempt- Case management consult in  Patient recovering well, Stable  Desires discharge today    Plan:  Continue routine post partum care  Pain management PRN  Follow case management consultation  Recommend blood pressure check outpatient in 1 week  Encourage ambulation  Encourage breastfeeding      Subjective/Objective   Chief Complaint:    Postpartum state    Subjective:   Patient doing well this AM seen with baby at bedside  She desires discharge today    Pain: yes, cramping, improved with meds  Tolerating PO: yes  Voiding: yes  Flatus: yes  BM: no  Ambulating: yes  Breastfeeding:  yes  Chest pain: no  Shortness of breath: no  Leg pain: no  Lochia: minimal    Objective:     Vitals: Temp:  [97 4 °F (36 3 °C)-98 3 °F (36 8 °C)] 97 4 °F (36 3 °C)  HR:  [] 86  Resp:  [18-20] 18  BP: (102-144)/(62-97) 102/62       Intake/Output Summary (Last 24 hours) at 17 5563  Last data filed at 17 0492   Gross per 24 hour   Intake                0 ml   Output              600 ml   Net             -600 ml         Physical Exam:   General: NAD, alert, oriented  Cardio: Regular rate and rhythm, no murmur  Resp: nonlabored breathing, clear to auscultation bilaterally  Abdomen: Soft, no distension/rebound/guarding/tenderness   Fundus: Firm, non-tender, fundus: 1cm below umbilicus  G/U: minimal lochia noted on pad  Lower Extremities: Non-tender, no palpable cords    Medications:  Current Facility-Administered Medications   Medication Dose Route Frequency    acetaminophen (TYLENOL) tablet 650 mg  650 mg Oral Q6H PRN    benzocaine-menthol-lanolin-aloe (DERMOPLAST) 20-0 5 % topical spray   Topical 4x Daily PRN    diphenhydrAMINE (BENADRYL) tablet 25 mg  25 mg Oral Q6H PRN    docusate sodium (COLACE) capsule 100 mg  100 mg Oral BID    hydrocortisone 1 % cream 1 application  1 application Topical PRN    ibuprofen (MOTRIN) tablet 600 mg  600 mg Oral Q6H PRN    ondansetron (ZOFRAN) injection 4 mg  4 mg Intravenous Q8H PRN    oxyCODONE-acetaminophen (PERCOCET) 5-325 mg per tablet 1 tablet  1 tablet Oral Q4H PRN    oxyCODONE-acetaminophen (PERCOCET) 5-325 mg per tablet 2 tablet  2 tablet Oral Q4H PRN    simethicone (MYLICON) chewable tablet 80 mg  80 mg Oral 4x Daily PRN    witch hazel-glycerin (TUCKS) topical pad 1 pad  1 pad Topical PRN       Frosty   12/20/2017  6:13 AM

## 2017-12-20 NOTE — PLAN OF CARE
ALTERATION IN THE BREAST     Optimize infant feeding at the breast Adequate for Discharge        DISCHARGE PLANNING     Discharge to home or other facility with appropriate resources Adequate for Discharge        INADEQUATE LATCH, SUCK OR SWALLOW     Demonstrate ability to latch and sustain latch, audible swallowing and satiety Adequate for Discharge        INFECTION - ADULT     Absence or prevention of progression during hospitalization Adequate for Discharge     Absence of fever/infection during neutropenic period Adequate for Discharge        Knowledge Deficit     Verbalizes understanding of labor plan Adequate for Discharge     Patient/family/caregiver demonstrates understanding of disease process, treatment plan, medications, and discharge instructions Adequate for Discharge        Labor & Delivery     Manages discomfort Adequate for Discharge     Patient vital signs are stable Adequate for Discharge        PAIN - ADULT     Verbalizes/displays adequate comfort level or baseline comfort level Adequate for Discharge        SAFETY ADULT     Patient will remain free of falls Adequate for Discharge     Maintain or return to baseline ADL function Adequate for Discharge     Maintain or return mobility status to optimal level Adequate for Discharge

## 2017-12-27 LAB — PLACENTA IN STORAGE: NORMAL

## 2018-01-10 NOTE — RESULT NOTES
Verified Results  US OB LESS THAN 14 WKS WITH TRANSVAGINAL 24Apr2017 07:01PM Luz Apple     Test Name Result Flag Reference   US OB LESS THAN 14 WEEKS WITH TRANSVAGINAL (Report)     FIRST TRIMESTER OBSTETRIC ULTRASOUND, COMPLETE     INDICATION: Positive urine pregnancy test  Evaluate for size and dates  LMP of 3/3/2017 projects to 7 weeks 3 days, with EFRAIN of 12/8/2017  COMPARISON: None  TECHNIQUE:  Transabdominal ultrasound of the pelvis was performed  Additional transvaginal imaging was then performed to better assess the gestation, myometrial/endometrial architecture and ovarian parenchymal detail  The study includes volumetric    sweeps and traditional still imaging technique  FINDINGS:     A single live intrauterine gestation is identified  Cardiac activity is present  Heart rate of 97 bpm      YOLK SAC: Present and normal in size and appearance  MEAN GESTATIONAL SAC SIZE: 15 mm = 5 weeks 6 days    MEAN CROWN RUMP LENGTH: 26 mm = 5 weeks 6 days    FETAL ANATOMY: Appropriate for gestational age  AMNIOTIC FLUID/SAC SHAPE: Within expected normal range  PLACENTA: The placenta is appropriate for gestational age  There is no significant subchorionic fluid collection  UTERUS/ADNEXA:    The uterus and ovaries are within normal limits  Left ovarian corpus luteum cyst noted  The cervix remains closed  No free fluid present  IMPRESSION:     Single live intrauterine gestation at 5 weeks 6 days (range +/- 1 week)  EFRAIN of 12/19/2017  Workstation performed: [id number]     Signed by:   Anne Serrano MD   4/26/17     (1) PRENATAL PANEL 18Apr2017 02:16PM Luz Ewing   TW Order Number: [id number]     Test Name Result Flag Reference   HIV 1/2 AND P24 Non-Reactive  Non-Reactive   This test detects HIV 1, HIV2 and p24 Antigen     BILIRUBIN UA Negative  Negative   CLARITY Clear     COLOR Yellow     KETONES UA Negative mg/dl  Negative   LEUKOCYTE ESTERASE UA Negative  Negative   NITRITE UA Negative  Negative   BLOOD UA Negative  Negative   PH UA 6 5  4 5-8 0   PROTEIN UA Negative mg/dl  Negative   SPECIFIC GRAVITY UA <=1 005  1 003-1 030   GLUCOSE UA Negative mg/dl  Negative   UROBILINOGEN UA 0 2 E U /dl  0 2, 1 0 E U /dl   HEPATITIS B SURFACE ANTIGEN Non-reactive  Non-reactive, NonReactive - Confirmed   BASOPHILS ABSOLUTE COUNT 0 01 Thousands/? ??L  0 00-0 10   EOSINOPHILS ABSOLUTE COUNT 0 02 Thousand/? ??L  0 00-0 61   LYMPHOCYTES ABSOLUTE COUNT 1 12 Thousands/? ??L  0 60-4 47   MONOCYTES ABSOLUTE COUNT 0 92 Thousand/? ??L  0 17-1 22   NEUTROPHILS ABSOLUTE COUNT 7 60 Thousands/? ??L  1 85-7 62   BASOPHILS RELATIVE PERCENT 0 %  0-1   EOSINOPHILS RELATIVE PERCENT 0 %  0-6   HEMATOCRIT 42 7 %  34 8-46 1   HEMOGLOBIN 14 4 g/dL  11 5-15 4   LYMPHOCYTES RELATIVE PERCENT 12 % L 14-44   MCH 31 7 pg  26 8-34 3   MCHC 33 7 g/dL  31 4-37 4   MCV 94 fL  82-98   MONOCYTES RELATIVE PERCENT 10 %  4-12   PLATELET COUNT 920 Thousands/uL  149-390   MPV 10 9 fL  8 9-12 7   RBC COUNT 4 54 Million/uL  3 81-5 12   RDW 12 1 %  11 6-15 1   NEUTROPHILS RELATIVE PERCENT 78 % H 43-75   WBC COUNT 9 67 Thousand/uL  4 31-10 16   RUBELLA (IGG) 48 9 IU/mL  >9 9   Rubella IgG       <5 0 IU/mL     Negative: not immune      5 0-9 9 IU/mL  Equivocal     >9 9 IU/mL     Positive: immune   ABO GROUPING A     RH FACTOR Positive     ANTIBODY SCREEN Negative     RPR Non-Reactive  Non-Reactive   CLINICAL REPORT (Report)     Test:        Urine culture  Specimen Source:  Urine, Clean Catch  Specimen Type:   Urine  Specimen Date:   4/18/2017 2:16 PM  Result Date:    4/19/2017 2:23 PM  Result Status:   Final result  Resulting Lab:   [address] [address]            Tel: [phone]      CULTURE                                       ------------------                                   10,000-19,000 cfu/ml Mixed Contaminants X4   SPECIMEN EXPIRATION DATE [id number]       (1) VARICELLA ZOSTER IMMUNITY(IGG) [id number] 02:16PM Avanell Cinnamon Order Number: [id number]     Test Name Result Flag Reference   KAJAL ZOSTER IGG IMMUNE  IMMUNE   INTERPRETATION:    IMMUNE     - Presumed immune to VZV IgG infection  EQUIVOCAL  - Suggest repeat testing in 2-4 weeks  NON-IMMUNE - Presumed non-immune to VZV IgG infection  (1) DRUG ABUSE SCREEN, URINE ROUTINE 25Gyw3274 02:16PM Avanell Cinnamon Order Number: [id number]     Test Name Result Flag Reference   AMPHETAMINE SCREEN URINE Negative ng/mL  Bsmrme=8601   Amphetamine test includes Amphetamine and Methamphetamine  BARBITURATE SCREEN URINE Negative ng/mL  Wyufnq=056   BENZODIAZEPINE SCREEN, URINE Negative ng/mL  Nxjddx=454   CANNABINOID SCREEN URINE Positive A Cutoff=50   Carboxy THC GC/MS Conf      161                ng/mL Cutoff=15        01   COCAINE(METAB  )SCREEN, URINE Negative ng/mL  Yyzdts=966   METHADONE SCREEN, URINE Negative ng/mL  Ttiwkf=416   OPIATE SCREEN URINE Negative ng/mL  Nqkntg=122   Opiate test includes Codeine and Morphine only  PHENCYCLIDINE (PCP), QUAL, UR Negative ng/mL  Cutoff=25   PROPOXYPHENE, SCREEN Negative ng/mL  Vbhrjw=202   Performed at:  [address]  [id number]  : Gerber Victoria MD, Phone:  [phone]     (1) TOXOPLASMA BELEN IGG/IGM [id number] 02:16PM Lorri Diamond    Order Number: [id number]     Test Name Result Flag Reference   TOXOPLASMA GONDII IGG <3 0 IU/mL  0 0 - 7 1   Negative        <7 2                                   Equivocal  7 2 - 8 7                                   Positive        >8 7   TOXOPLASMA IGM ANTIBODY <3 0 AU/mL  0 0 - 7 9   Negative            <8 0                               Equivocal      8 0 - 9 9                               Positive            >9 9   CLINICAL COMMENT Comment     No serological evidence of infection with Toxoplasma   If symptoms  persist, submit a new specimen after three weeks      Performed at:  [facility] [address]  [id number]  : Paulette Chu MD, Phone:  [phone]

## 2018-01-11 NOTE — RESULT NOTES
Verified Results  (1) CBC/PLT/DIFF [id number] 03:02PM Ani Carranza    Order Number: [id number]     Test Name Result Flag Reference   WBC COUNT 10 94 Thousand/uL H 4 31-10 16   RBC COUNT 3 72 Million/uL L 3 81-5 12   HEMOGLOBIN 11 4 g/dL L 11 5-15 4   HEMATOCRIT 34 2 % L 34 8-46  1   MCV 92 fL  82-98   MCH 30 6 pg  26 8-34 3   MCHC 33 3 g/dL  31 4-37 4   RDW 12 3 %  11 6-15 1   MPV 9 9 fL  8 9-12 7   PLATELET COUNT 519 Thousands/uL  149-390   NEUTROPHILS RELATIVE PERCENT 83 % H 43-75   LYMPHOCYTES RELATIVE PERCENT 8 % L 14-44   MONOCYTES RELATIVE PERCENT 9 %  4-12   EOSINOPHILS RELATIVE PERCENT 0 %  0-6   BASOPHILS RELATIVE PERCENT 0 %  0-1   NEUTROPHILS ABSOLUTE COUNT 9 06 Thousands/? ??L H 1 85-7 62   LYMPHOCYTES ABSOLUTE COUNT 0 89 Thousands/? ??L  0 60-4 47   MONOCYTES ABSOLUTE COUNT 0 94 Thousand/? ??L  0 17-1 22   EOSINOPHILS ABSOLUTE COUNT 0 03 Thousand/? ??L  0 00-0 61   BASOPHILS ABSOLUTE COUNT 0 02 Thousands/? ??L  0 00-0 10     (1) GLUCOSE, 1HR PG (50gm Glu Challenge Preg-Pts) [id number] 03:02PM James AdventEnna Order Number: [id number]     Test Name Result Flag Reference   GLUCOSE, 1 Hr  mg/dL H See Comment   Specimen collection should occur prior to Sulfasalazine administration due to the potential for falsely depressed results  Specimen collection should occur prior to Sulfapyridine administration due to the potential for falsely elevated results  Specimen collection should occur prior to Sulfasalazine administration due to the potential for falsely depressed results  Specimen collection should occur prior to Sulfapyridine administration due to the potential for falsely elevated results  Plan  Abnormal glucose    · (1) GLUCOSE TOLERANCE TEST, 3HR; Status:Active;  Requested [id number];

## 2018-01-11 NOTE — RESULT NOTES
Verified Results  (1) GLUCOSE TOLERANCE TEST, 3HR [id number] 12:08PM Kel [facility] Order Number: [id number]     Test Name Result Flag Reference   GLUCOSE TOLERANCE FASTING 88 mg/dL  65-99   Specimen collection should occur prior to Sulfasalazine administration due to the potential for falsely depressed results  Specimen collection should occur prior to Sulfapyridine administration due to the potential for falsely elevated results  GLUCOSE 1 HOUR 100 GM GLUC CHALLENGE-PREG  mg/dL     Specimen collection should occur prior to Sulfasalazine administration due to the potential for falsely depressed results  Specimen collection should occur prior to Sulfapyridine administration due to the potential for falsely elevated results  GLUCOSE 2 HOUR 100 GM GLUC CHALLENGE-PREG  mg/dL     Specimen collection should occur prior to Sulfasalazine administration due to the potential for falsely depressed results  Specimen collection should occur prior to Sulfapyridine administration due to the potential for falsely elevated results     GLUCOSE, 3HR (100gm Gluc Challenge Preg-Pts) 112 mg/dL

## 2018-01-12 NOTE — MISCELLANEOUS
Message  Return to work or school:    She is able to return to work on  05/08/2017       Please excuse Joe Abdi from work on 5/8/2017        Signatures   Electronically signed by : Bam Beckwith, ; May  5 2017 11:15AM EST                       (Author)

## 2018-01-13 NOTE — PROGRESS NOTES
AUG 1 2017         RE: Osmany Monroy                                  To: Papa MERLYN Vale    MR#: [id number]                                    [facility]   : [facility]   ENC: [id number]                             ROSS [facility]   (Exam #: [id number])                           Fax: [phone]      The LMP of this 33year old,  G1, P0-0-0-0 patient was MAR 3 2017, her   working EFRAIN is DEC 23 2017 and the current gestational age is 20 weeks 0   days by [address] [place] [address]  A sonographic examination was performed on AUG   1 2017 using real time equipment  The ultrasound examination was performed   using abdominal & vaginal techniques  The patient has a BMI of 24 4  Her   blood pressure today was 108/65  Earliest ultrasound found in her record: 17  5w6d  17 EFRAIN      Problem list:   1  FOB's mother with a cleft palate   2  Marijuana use in early pregnancy  Paramjit Patricio is on prenatal vitamins and denies any known drug allergies  Her   past medical history is significant for panic/anxiety syndrome and a   history of a suicide attempt with illicit drug use in the distant past    She also has a history of a renal calculus  Her surgical history includes   surgery on her shoulder  She reports a history of smoking but quit when   she is pregnant and denies any use of alcohol or illicit drugs and   pregnancy  Her family history is significant for a half brother with   Asperger's  She denies any family history of hypertension, diabetes,   thrombosis or congenital anomalies  The father of  the baby's mother has a   cleft palate  This is her first pregnancy  She had a negative urine drug   screen in July and a positive one for marijuana in April  She declined any   genetic screening        Cardiac motion was observed at 136 bpm       INDICATIONS      fetal anatomical survey   marijuana use in pregnancy      Exam Types      LEVEL II   Transvaginal      RESULTS      Fetus # 1 of 1   Vertex presentation   Fetal growth appeared normal   Placenta Location = Posterior   No placenta previa   Placenta Grade = I      MEASUREMENTS (* Included In Average GA)      AC              14 2 cm        19 weeks 2 days* (37%)   BPD              4 7 cm        20 weeks 1 day * (56%)   HC              17 1 cm        19 weeks 4 days* (35%)   Femur            3 2 cm        20 weeks 1 day * (41%)      NBL              7 2 mm      Humerus          3 2 cm        20 weeks 6 days  (69%)      Biorbit          3 1 cm        20 weeks 1 day      HC/AC           1 20   FL/AC           0 22   FL/BPD          0 68   EFW (Ac/Fl/Hc)   306 grams - 0 lbs 11 oz      THE AVERAGE GESTATIONAL AGE is 19 weeks 6 days +/- 10 days  AMNIOTIC FLUID         Largest Vertical Pocket = 3 7 cm   Amniotic Fluid: Normal      CERVICAL EVALUATION      SUPINE      Cervical Length: 3 60 cm      OTHER TEST RESULTS     Dynamic Changes?: No                Resp  To TFP?: No      ANATOMY      Head                                    Normal   Face/Neck                               Normal   Th  Cav  Normal   Heart                                   Normal   Abd  Cav  Normal   Stomach                                 Normal   Right Kidney                            Normal   Left Kidney                             Normal   Bladder                                 Normal   Abd  Wall                               Normal   Spine                                   Normal   Extrems                                 Normal   Genitalia                               Normal   Placenta                                Normal   Umbl   Cord                              Normal   Uterus                                  Normal   PCI                                     Normal      ANATOMY DETAILS      Visualized Appearing Sonographically Normal:   HEAD: (Calvarium, BPD Level, Cavum, Lateral Ventricles, Choroid Plexus,   Cerebellum, Cisterna Magna);    FACE/NECK: (Neck, Nuchal Fold, Profile,   Orbits, Nose/Lips, Palate, Face);    TH  CAV  : (Lungs, Diaphragm); HEART: (Four Chamber View, Proximal Left Outflow, Proximal Right Outflow,   3VV, 3 Vessel Trachea, Short Axis of Greater Vessels, Ductal Arch, Aortic   Arch, Interventricular Septum, Interatrial Septum, IVC, SVC, Cardiac Axis,   Cardiac Position);    ABD  CAV : (Liver);    STOMACH, RIGHT KIDNEY, LEFT   KIDNEY, BLADDER, ABD  WALL, SPINE: (Cervical Spine, Thoracic Spine, Lumbar   Spine, Sacrum);    EXTREMS: (Lt Humerus, Rt Humerus, Lt Forearm, Rt   Forearm, Lt Hand, Rt Hand, Lt Femur, Rt Femur, Lt Low Leg, Rt Low Leg, Lt   Foot, Rt Foot);    GENITALIA, PLACENTA, UMBL  CORD, UTERUS, PCI      ANATOMY COMMENTS       Her survey of the fetal anatomy is complete  No fetal structural abnormality or ultrasound marker for aneuploidy is   identified on the Level II ultrasound study today  Fetal growth and   amniotic fluid volume appear normal   Active movement of the fetal body &   extremities was seen  There is no suspicion of a subchorionic bleed  The   placental cord insertion was normal       ADNEXA      The left ovary appeared normal and measured 3 0 x 2 3 x 1 5 cm with a   volume of 5 4 cc  The right ovary appeared normal and measured 2 5 x 2 5 x   1 7 cm with a volume of 5 6 cc  IMPRESSION      [place] IUP   19 weeks and 6 days by this ultrasound  (EFRAIN=DEC 20 2017)   20 weeks and 0 days by 1st Tri Sono  (EFRAIN=DEC 19 2017)   Vertex presentation   Fetal growth appeared normal   Normal anatomy survey   Regular fetal heart rate of 136 bpm   Posterior placenta   No placenta previa      GENERAL COMMENT      The patient was informed of the findings and counseled about the   limitations of the exam in detecting all forms of fetal congenital   abnormalities  The patient has no complaints today   She reports fetal movements and   denies vaginal bleeding  She declined genetic screening  She reports she   has not used marijuana since she learned that she was pregnant  RECOMMENDATION      Growth Ultrasound: As indicated      MARK Arias M D  Maternal-Fetal Medicine   Electronically signed 08/02/17 21:45           Electronically signed Kinga ZULETA    Aug  2 2017 11:19PM EST Acknowledgement

## 2018-01-14 VITALS
SYSTOLIC BLOOD PRESSURE: 108 MMHG | HEIGHT: 64 IN | BODY MASS INDEX: 24.24 KG/M2 | WEIGHT: 142 LBS | DIASTOLIC BLOOD PRESSURE: 65 MMHG

## 2018-01-22 VITALS
BODY MASS INDEX: 21.68 KG/M2 | SYSTOLIC BLOOD PRESSURE: 116 MMHG | WEIGHT: 127 LBS | HEART RATE: 76 BPM | HEIGHT: 64 IN | DIASTOLIC BLOOD PRESSURE: 66 MMHG

## 2018-01-22 VITALS
SYSTOLIC BLOOD PRESSURE: 110 MMHG | HEART RATE: 86 BPM | HEIGHT: 64 IN | BODY MASS INDEX: 29.94 KG/M2 | DIASTOLIC BLOOD PRESSURE: 80 MMHG | WEIGHT: 175.38 LBS

## 2018-01-22 VITALS
HEART RATE: 88 BPM | WEIGHT: 166.5 LBS | BODY MASS INDEX: 28.42 KG/M2 | DIASTOLIC BLOOD PRESSURE: 78 MMHG | SYSTOLIC BLOOD PRESSURE: 118 MMHG | HEIGHT: 64 IN | RESPIRATION RATE: 16 BRPM

## 2018-01-22 VITALS
HEART RATE: 76 BPM | OXYGEN SATURATION: 99 % | WEIGHT: 138.5 LBS | BODY MASS INDEX: 23.64 KG/M2 | HEIGHT: 64 IN | DIASTOLIC BLOOD PRESSURE: 72 MMHG | SYSTOLIC BLOOD PRESSURE: 110 MMHG

## 2018-01-22 VITALS
HEART RATE: 100 BPM | SYSTOLIC BLOOD PRESSURE: 110 MMHG | BODY MASS INDEX: 27.31 KG/M2 | RESPIRATION RATE: 16 BRPM | DIASTOLIC BLOOD PRESSURE: 78 MMHG | HEIGHT: 64 IN | WEIGHT: 160 LBS

## 2018-01-22 VITALS — WEIGHT: 128 LBS | BODY MASS INDEX: 21.85 KG/M2 | HEIGHT: 64 IN

## 2018-01-22 VITALS
SYSTOLIC BLOOD PRESSURE: 138 MMHG | DIASTOLIC BLOOD PRESSURE: 98 MMHG | BODY MASS INDEX: 29.24 KG/M2 | HEART RATE: 95 BPM | HEIGHT: 64 IN | WEIGHT: 171.25 LBS

## 2018-01-22 VITALS
SYSTOLIC BLOOD PRESSURE: 122 MMHG | RESPIRATION RATE: 16 BRPM | WEIGHT: 157 LBS | HEIGHT: 64 IN | BODY MASS INDEX: 26.8 KG/M2 | HEART RATE: 93 BPM | DIASTOLIC BLOOD PRESSURE: 78 MMHG

## 2018-01-22 VITALS
HEIGHT: 64 IN | BODY MASS INDEX: 23.05 KG/M2 | SYSTOLIC BLOOD PRESSURE: 118 MMHG | HEART RATE: 87 BPM | DIASTOLIC BLOOD PRESSURE: 78 MMHG | WEIGHT: 135 LBS

## 2018-01-22 VITALS
SYSTOLIC BLOOD PRESSURE: 118 MMHG | WEIGHT: 152 LBS | HEIGHT: 64 IN | BODY MASS INDEX: 25.95 KG/M2 | DIASTOLIC BLOOD PRESSURE: 80 MMHG

## 2018-01-22 VITALS
HEART RATE: 105 BPM | DIASTOLIC BLOOD PRESSURE: 64 MMHG | WEIGHT: 148 LBS | SYSTOLIC BLOOD PRESSURE: 98 MMHG | RESPIRATION RATE: 16 BRPM | BODY MASS INDEX: 25.27 KG/M2 | HEIGHT: 64 IN

## 2018-01-22 VITALS
HEIGHT: 64 IN | WEIGHT: 166.13 LBS | SYSTOLIC BLOOD PRESSURE: 110 MMHG | BODY MASS INDEX: 28.36 KG/M2 | HEART RATE: 99 BPM | DIASTOLIC BLOOD PRESSURE: 72 MMHG

## 2018-01-22 VITALS — DIASTOLIC BLOOD PRESSURE: 98 MMHG | SYSTOLIC BLOOD PRESSURE: 118 MMHG

## 2018-01-23 NOTE — MISCELLANEOUS
To Whom It May Concern,    Cecilia Hurst is under my professional care for her pregency with anticapted due date of 12/19/2017   We recommend  her to refrain from working as of 12/11/2017  through the duration of her pregancy   Any questions please contact the office            Sincerely,        Dr Marino Loaiza MD

## 2018-01-24 VITALS
HEIGHT: 64 IN | SYSTOLIC BLOOD PRESSURE: 132 MMHG | WEIGHT: 177 LBS | DIASTOLIC BLOOD PRESSURE: 88 MMHG | RESPIRATION RATE: 20 BRPM | HEART RATE: 100 BPM | BODY MASS INDEX: 30.22 KG/M2

## 2018-01-24 VITALS
SYSTOLIC BLOOD PRESSURE: 140 MMHG | WEIGHT: 179 LBS | BODY MASS INDEX: 30.56 KG/M2 | DIASTOLIC BLOOD PRESSURE: 92 MMHG | HEIGHT: 64 IN | HEART RATE: 108 BPM

## 2018-02-12 ENCOUNTER — POSTPARTUM VISIT (OUTPATIENT)
Dept: OBGYN CLINIC | Facility: CLINIC | Age: 28
End: 2018-02-12

## 2018-02-12 VITALS
WEIGHT: 166 LBS | HEIGHT: 64 IN | SYSTOLIC BLOOD PRESSURE: 124 MMHG | DIASTOLIC BLOOD PRESSURE: 84 MMHG | BODY MASS INDEX: 28.34 KG/M2

## 2018-02-12 PROBLEM — O14.00 MILD PRE-ECLAMPSIA: Status: RESOLVED | Noted: 2017-12-18 | Resolved: 2018-02-12

## 2018-02-12 PROBLEM — Z3A.39 39 WEEKS GESTATION OF PREGNANCY: Status: RESOLVED | Noted: 2017-12-18 | Resolved: 2018-02-12

## 2018-02-12 PROBLEM — O42.90 PREMATURE RUPTURE OF MEMBRANES: Status: RESOLVED | Noted: 2017-12-18 | Resolved: 2018-02-12

## 2018-02-12 PROCEDURE — PNV: Performed by: OBSTETRICS & GYNECOLOGY

## 2018-02-12 RX ORDER — DIPHENOXYLATE HYDROCHLORIDE AND ATROPINE SULFATE 2.5; .025 MG/1; MG/1
1 TABLET ORAL DAILY
COMMUNITY
End: 2020-08-31

## 2018-02-12 NOTE — PROGRESS NOTES
Subjective     Marimar Grayson is a 33 y o  y o  female Nola Beebe who presents for a postpartum visit  She is 8 weeks postpartum following a Vacuum Delivery on 12/19/17    I have fully reviewed the prenatal and intrapartum course  The delivery was at 40 gestational weeks  Outcome: vacuum, low  Anesthesia: epidural  Postpartum course has been uncomplicated  Baby's course has been uncomplicated  Baby is feeding by bottle  Bleeding no bleeding  Bowel function is normal  Bladder function is normal  Patient is sexually active  Contraception method is none  Postpartum depression screening: negative  The following portions of the patient's history were reviewed and updated as appropriate: allergies, current medications, past family history, past medical history, past social history, past surgical history and problem list     Review of Systems  Pertinent items are noted in HPI       Objective     /64   Wt 59 4 kg (131 lb)   BMI 22 49 kg/m²    General:   appears stated age, cooperative, alert normal mood and affect   Neck: Neck: normal, supple,trachea midline, no masses   Heart: regular rate and rhythm, S1, S2 normal, no murmur, click, rub or gallop   Lungs: clear to auscultation bilaterally   Breasts: normal appearance, no masses or tenderness   Abdomen: soft, non-tender, without masses or organomegaly                             Assessment/Plan      postpartum exam  Pap smea rwas normal in 2015 at today's visit  1  Contraception: declines  Would like another baby in 2 years  Discussed that she can get pregnant if she does not use condoms  Still declines   2   Will return in June for annual exam and pap

## 2018-02-12 NOTE — LETTER
February 12, 2018       Patient: Sander Felipe   YOB: 1990   Date of Visit: 2/12/2018       To Whom it may concern    I have seen and evaluated Lito Brito in my office today, and feel like she can go back to work on 2/19/18  If you have any questions or concerns, please do not hesitate to call my office             Sincerely,        Negro Dyer MD

## 2018-03-07 NOTE — PROGRESS NOTES
Education  Education Items with no Session   Adacel 5-2-15 5 LF-MCG/0 5 Intramuscular Suspension;  Provided: [id number]  04:40PM; Counselor: Alicia Aguirre; Baby & Me Education 3rd Trimester: Third Trimester Education provided:   benefits of breastfeeding, importance of exclusive breastfeeding, early initiation of breastfeeding, exclusive breastfeeding for the first 6 months, non-pharmacologic pain relief methods for labor and 28 week packet given  rooming-in on 24 hour basis      Mother has not registered for prenatal class  Thought has not been given to selecting a pediatrician     Prenatal education provided by: Avi Carlos   Electronically signed by : GEOVANNA White ; Sep 26 2017  4:53PM EST                       (Author)

## 2018-03-07 NOTE — PROGRESS NOTES
Education  Baby & Me Education 2nd Trimester:   Second Trimester Education provided:   benefits of breastfeeding, importance of exclusive breastfeeding, early initiation of breastfeeding, exclusive breastfeeding for the first 6 months, non-pharmacologic pain relief methods for labor, rooming-in on 24 hour basis and Pregnancy Essentials Reference Guide given         Signatures   Electronically signed by : GEOVANNA Collier ; Jul 18 2017  5:57PM EST                       (Author)

## 2018-03-07 NOTE — PROGRESS NOTES
[facility] Education 1st Trimester:   First Trimester Education provided:   The patient is planning on breastfeeding  Individualized education given: [facility] given     Prenatal education provided by: Maribel العلي MA      Signatures   Electronically signed by : Maribel العلي, ; Apr 18 2017  2:15PM EST                       (Author)

## 2020-08-31 ENCOUNTER — HOSPITAL ENCOUNTER (OUTPATIENT)
Facility: HOSPITAL | Age: 30
Setting detail: OBSERVATION
Discharge: HOME | End: 2020-08-31
Attending: EMERGENCY MEDICINE | Admitting: INTERNAL MEDICINE
Payer: COMMERCIAL

## 2020-08-31 ENCOUNTER — ANESTHESIA EVENT (OUTPATIENT)
Dept: PERIOP | Facility: HOSPITAL | Age: 30
End: 2020-08-31
Payer: COMMERCIAL

## 2020-08-31 ENCOUNTER — APPOINTMENT (EMERGENCY)
Dept: CT IMAGING | Facility: HOSPITAL | Age: 30
End: 2020-08-31
Payer: COMMERCIAL

## 2020-08-31 ENCOUNTER — APPOINTMENT (OUTPATIENT)
Dept: RADIOLOGY | Facility: HOSPITAL | Age: 30
End: 2020-08-31
Payer: COMMERCIAL

## 2020-08-31 ENCOUNTER — TELEPHONE (OUTPATIENT)
Dept: OTHER | Facility: HOSPITAL | Age: 30
End: 2020-08-31

## 2020-08-31 ENCOUNTER — ANESTHESIA (OUTPATIENT)
Dept: PERIOP | Facility: HOSPITAL | Age: 30
End: 2020-08-31
Payer: COMMERCIAL

## 2020-08-31 VITALS
RESPIRATION RATE: 16 BRPM | TEMPERATURE: 98 F | DIASTOLIC BLOOD PRESSURE: 77 MMHG | HEART RATE: 77 BPM | WEIGHT: 159.17 LBS | HEIGHT: 64 IN | OXYGEN SATURATION: 100 % | SYSTOLIC BLOOD PRESSURE: 111 MMHG | BODY MASS INDEX: 27.17 KG/M2

## 2020-08-31 DIAGNOSIS — R11.2 NAUSEA & VOMITING: ICD-10-CM

## 2020-08-31 DIAGNOSIS — N13.2 URETERAL STONE WITH HYDRONEPHROSIS: Primary | ICD-10-CM

## 2020-08-31 DIAGNOSIS — N12 PYELONEPHRITIS: ICD-10-CM

## 2020-08-31 PROBLEM — IMO0001 SMOKING: Status: ACTIVE | Noted: 2020-08-31

## 2020-08-31 PROBLEM — R51.9 HEADACHE: Status: ACTIVE | Noted: 2020-08-31

## 2020-08-31 PROBLEM — N20.0 NEPHROLITHIASIS: Status: ACTIVE | Noted: 2020-08-31

## 2020-08-31 PROBLEM — F17.200 SMOKING: Status: ACTIVE | Noted: 2020-08-31

## 2020-08-31 PROBLEM — Z98.890 PONV (POSTOPERATIVE NAUSEA AND VOMITING): Status: ACTIVE | Noted: 2020-08-31

## 2020-08-31 PROBLEM — F41.9 ANXIETY: Status: ACTIVE | Noted: 2020-08-31

## 2020-08-31 LAB
ALBUMIN SERPL BCP-MCNC: 3.7 G/DL (ref 3.5–5)
ALP SERPL-CCNC: 58 U/L (ref 46–116)
ALT SERPL W P-5'-P-CCNC: 25 U/L (ref 12–78)
ANION GAP SERPL CALCULATED.3IONS-SCNC: 6 MMOL/L (ref 4–13)
ANION GAP SERPL CALCULATED.3IONS-SCNC: 9 MMOL/L (ref 4–13)
APTT PPP: 29 SECONDS (ref 23–37)
AST SERPL W P-5'-P-CCNC: 12 U/L (ref 5–45)
BACTERIA UR QL AUTO: ABNORMAL /HPF
BASOPHILS # BLD AUTO: 0.03 THOUSANDS/ΜL (ref 0–0.1)
BASOPHILS # BLD MANUAL: 0 THOUSAND/UL (ref 0–0.1)
BASOPHILS NFR BLD AUTO: 0 % (ref 0–1)
BASOPHILS NFR MAR MANUAL: 0 % (ref 0–1)
BILIRUB SERPL-MCNC: 0.4 MG/DL (ref 0.2–1)
BILIRUB UR QL STRIP: NEGATIVE
BUN SERPL-MCNC: 12 MG/DL (ref 5–25)
BUN SERPL-MCNC: 16 MG/DL (ref 5–25)
CALCIUM SERPL-MCNC: 7.7 MG/DL (ref 8.3–10.1)
CALCIUM SERPL-MCNC: 8.9 MG/DL (ref 8.3–10.1)
CHLORIDE SERPL-SCNC: 104 MMOL/L (ref 100–108)
CHLORIDE SERPL-SCNC: 107 MMOL/L (ref 100–108)
CK SERPL-CCNC: 41 U/L (ref 26–192)
CLARITY UR: ABNORMAL
CO2 SERPL-SCNC: 24 MMOL/L (ref 21–32)
CO2 SERPL-SCNC: 24 MMOL/L (ref 21–32)
COLOR UR: YELLOW
CREAT SERPL-MCNC: 0.65 MG/DL (ref 0.6–1.3)
CREAT SERPL-MCNC: 0.7 MG/DL (ref 0.6–1.3)
EOSINOPHIL # BLD AUTO: 0.08 THOUSAND/ΜL (ref 0–0.61)
EOSINOPHIL # BLD MANUAL: 0 THOUSAND/UL (ref 0–0.4)
EOSINOPHIL NFR BLD AUTO: 1 % (ref 0–6)
EOSINOPHIL NFR BLD MANUAL: 0 % (ref 0–6)
ERYTHROCYTE [DISTWIDTH] IN BLOOD BY AUTOMATED COUNT: 11.7 % (ref 11.6–15.1)
ERYTHROCYTE [DISTWIDTH] IN BLOOD BY AUTOMATED COUNT: 11.7 % (ref 11.6–15.1)
EXT PREG TEST URINE: NEGATIVE
EXT. CONTROL ED NAV: NORMAL
GFR SERPL CREATININE-BSD FRML MDRD: 117 ML/MIN/1.73SQ M
GFR SERPL CREATININE-BSD FRML MDRD: 120 ML/MIN/1.73SQ M
GLUCOSE SERPL-MCNC: 109 MG/DL (ref 65–140)
GLUCOSE SERPL-MCNC: 97 MG/DL (ref 65–140)
GLUCOSE UR STRIP-MCNC: NEGATIVE MG/DL
HCT VFR BLD AUTO: 41.8 % (ref 34.8–46.1)
HCT VFR BLD AUTO: 46.4 % (ref 34.8–46.1)
HGB BLD-MCNC: 14 G/DL (ref 11.5–15.4)
HGB BLD-MCNC: 16 G/DL (ref 11.5–15.4)
HGB UR QL STRIP.AUTO: ABNORMAL
IMM GRANULOCYTES # BLD AUTO: 0.03 THOUSAND/UL (ref 0–0.2)
IMM GRANULOCYTES NFR BLD AUTO: 0 % (ref 0–2)
INR PPP: 0.92 (ref 0.84–1.19)
KETONES UR STRIP-MCNC: NEGATIVE MG/DL
LACTATE SERPL-SCNC: 1.3 MMOL/L (ref 0.5–2)
LEUKOCYTE ESTERASE UR QL STRIP: ABNORMAL
LIPASE SERPL-CCNC: 160 U/L (ref 73–393)
LYMPHOCYTES # BLD AUTO: 0.3 THOUSAND/UL (ref 0.6–4.47)
LYMPHOCYTES # BLD AUTO: 1.39 THOUSANDS/ΜL (ref 0.6–4.47)
LYMPHOCYTES # BLD AUTO: 2 % (ref 14–44)
LYMPHOCYTES NFR BLD AUTO: 13 % (ref 14–44)
MCH RBC QN AUTO: 31.9 PG (ref 26.8–34.3)
MCH RBC QN AUTO: 32.1 PG (ref 26.8–34.3)
MCHC RBC AUTO-ENTMCNC: 33.5 G/DL (ref 31.4–37.4)
MCHC RBC AUTO-ENTMCNC: 34.5 G/DL (ref 31.4–37.4)
MCV RBC AUTO: 93 FL (ref 82–98)
MCV RBC AUTO: 95 FL (ref 82–98)
MONOCYTES # BLD AUTO: 0.89 THOUSAND/UL (ref 0–1.22)
MONOCYTES # BLD AUTO: 0.99 THOUSAND/ΜL (ref 0.17–1.22)
MONOCYTES NFR BLD AUTO: 9 % (ref 4–12)
MONOCYTES NFR BLD: 6 % (ref 4–12)
NEUTROPHILS # BLD AUTO: 8.02 THOUSANDS/ΜL (ref 1.85–7.62)
NEUTROPHILS # BLD MANUAL: 13.67 THOUSAND/UL (ref 1.85–7.62)
NEUTS BAND NFR BLD MANUAL: 1 % (ref 0–8)
NEUTS SEG NFR BLD AUTO: 77 % (ref 43–75)
NEUTS SEG NFR BLD AUTO: 91 % (ref 43–75)
NITRITE UR QL STRIP: NEGATIVE
NON-SQ EPI CELLS URNS QL MICRO: ABNORMAL /HPF
NRBC BLD AUTO-RTO: 0 /100 WBCS
NRBC BLD AUTO-RTO: 0 /100 WBCS
PH UR STRIP.AUTO: 6 [PH]
PLATELET # BLD AUTO: 186 THOUSANDS/UL (ref 149–390)
PLATELET # BLD AUTO: 216 THOUSANDS/UL (ref 149–390)
PLATELET BLD QL SMEAR: ADEQUATE
PMV BLD AUTO: 10.2 FL (ref 8.9–12.7)
PMV BLD AUTO: 10.4 FL (ref 8.9–12.7)
POTASSIUM SERPL-SCNC: 3.4 MMOL/L (ref 3.5–5.3)
POTASSIUM SERPL-SCNC: 3.7 MMOL/L (ref 3.5–5.3)
PROT SERPL-MCNC: 7 G/DL (ref 6.4–8.2)
PROT UR STRIP-MCNC: ABNORMAL MG/DL
PROTHROMBIN TIME: 12.5 SECONDS (ref 11.6–14.5)
RBC # BLD AUTO: 4.39 MILLION/UL (ref 3.81–5.12)
RBC # BLD AUTO: 4.99 MILLION/UL (ref 3.81–5.12)
RBC #/AREA URNS AUTO: ABNORMAL /HPF
SARS-COV-2 RNA RESP QL NAA+PROBE: NEGATIVE
SODIUM SERPL-SCNC: 137 MMOL/L (ref 136–145)
SODIUM SERPL-SCNC: 137 MMOL/L (ref 136–145)
SP GR UR STRIP.AUTO: 1.02 (ref 1–1.03)
TOTAL CELLS COUNTED SPEC: 100
UROBILINOGEN UR QL STRIP.AUTO: 0.2 E.U./DL
WBC # BLD AUTO: 10.54 THOUSAND/UL (ref 4.31–10.16)
WBC # BLD AUTO: 14.86 THOUSAND/UL (ref 4.31–10.16)
WBC #/AREA URNS AUTO: ABNORMAL /HPF

## 2020-08-31 PROCEDURE — 80053 COMPREHEN METABOLIC PANEL: CPT | Performed by: PHYSICIAN ASSISTANT

## 2020-08-31 PROCEDURE — 85610 PROTHROMBIN TIME: CPT | Performed by: PHYSICIAN ASSISTANT

## 2020-08-31 PROCEDURE — 81001 URINALYSIS AUTO W/SCOPE: CPT | Performed by: PHYSICIAN ASSISTANT

## 2020-08-31 PROCEDURE — 82550 ASSAY OF CK (CPK): CPT | Performed by: PHYSICIAN ASSISTANT

## 2020-08-31 PROCEDURE — 99285 EMERGENCY DEPT VISIT HI MDM: CPT

## 2020-08-31 PROCEDURE — 85730 THROMBOPLASTIN TIME PARTIAL: CPT | Performed by: PHYSICIAN ASSISTANT

## 2020-08-31 PROCEDURE — 83690 ASSAY OF LIPASE: CPT | Performed by: PHYSICIAN ASSISTANT

## 2020-08-31 PROCEDURE — G1004 CDSM NDSC: HCPCS

## 2020-08-31 PROCEDURE — 85025 COMPLETE CBC W/AUTO DIFF WBC: CPT | Performed by: PHYSICIAN ASSISTANT

## 2020-08-31 PROCEDURE — 74420 UROGRAPHY RTRGR +-KUB: CPT

## 2020-08-31 PROCEDURE — 85027 COMPLETE CBC AUTOMATED: CPT | Performed by: PHYSICIAN ASSISTANT

## 2020-08-31 PROCEDURE — 99245 OFF/OP CONSLTJ NEW/EST HI 55: CPT | Performed by: UROLOGY

## 2020-08-31 PROCEDURE — 85007 BL SMEAR W/DIFF WBC COUNT: CPT | Performed by: PHYSICIAN ASSISTANT

## 2020-08-31 PROCEDURE — 96361 HYDRATE IV INFUSION ADD-ON: CPT

## 2020-08-31 PROCEDURE — 99285 EMERGENCY DEPT VISIT HI MDM: CPT | Performed by: PHYSICIAN ASSISTANT

## 2020-08-31 PROCEDURE — 80048 BASIC METABOLIC PNL TOTAL CA: CPT | Performed by: PHYSICIAN ASSISTANT

## 2020-08-31 PROCEDURE — 74177 CT ABD & PELVIS W/CONTRAST: CPT

## 2020-08-31 PROCEDURE — 96375 TX/PRO/DX INJ NEW DRUG ADDON: CPT

## 2020-08-31 PROCEDURE — 99236 HOSP IP/OBS SAME DATE HI 85: CPT | Performed by: INTERNAL MEDICINE

## 2020-08-31 PROCEDURE — 87635 SARS-COV-2 COVID-19 AMP PRB: CPT | Performed by: PHYSICIAN ASSISTANT

## 2020-08-31 PROCEDURE — 36415 COLL VENOUS BLD VENIPUNCTURE: CPT | Performed by: PHYSICIAN ASSISTANT

## 2020-08-31 PROCEDURE — 87040 BLOOD CULTURE FOR BACTERIA: CPT | Performed by: EMERGENCY MEDICINE

## 2020-08-31 PROCEDURE — 52332 CYSTOSCOPY AND TREATMENT: CPT | Performed by: UROLOGY

## 2020-08-31 PROCEDURE — 81025 URINE PREGNANCY TEST: CPT | Performed by: PHYSICIAN ASSISTANT

## 2020-08-31 PROCEDURE — 96374 THER/PROPH/DIAG INJ IV PUSH: CPT

## 2020-08-31 PROCEDURE — C2617 STENT, NON-COR, TEM W/O DEL: HCPCS | Performed by: UROLOGY

## 2020-08-31 PROCEDURE — 83605 ASSAY OF LACTIC ACID: CPT | Performed by: PHYSICIAN ASSISTANT

## 2020-08-31 PROCEDURE — C1769 GUIDE WIRE: HCPCS | Performed by: UROLOGY

## 2020-08-31 PROCEDURE — 87086 URINE CULTURE/COLONY COUNT: CPT | Performed by: PHYSICIAN ASSISTANT

## 2020-08-31 DEVICE — STENT URETERAL 6 FR 26CM INLAY OPTIMA
Type: IMPLANTABLE DEVICE | Status: NON-FUNCTIONAL
Removed: 2020-10-02

## 2020-08-31 RX ORDER — SCOLOPAMINE TRANSDERMAL SYSTEM 1 MG/1
1 PATCH, EXTENDED RELEASE TRANSDERMAL ONCE
Status: DISCONTINUED | OUTPATIENT
Start: 2020-08-31 | End: 2020-08-31 | Stop reason: HOSPADM

## 2020-08-31 RX ORDER — CEFAZOLIN SODIUM 1 G/50ML
1000 SOLUTION INTRAVENOUS ONCE
Status: DISCONTINUED | OUTPATIENT
Start: 2020-08-31 | End: 2020-08-31 | Stop reason: HOSPADM

## 2020-08-31 RX ORDER — OXYCODONE HYDROCHLORIDE 5 MG/1
5 TABLET ORAL EVERY 6 HOURS PRN
Status: DISCONTINUED | OUTPATIENT
Start: 2020-08-31 | End: 2020-08-31 | Stop reason: HOSPADM

## 2020-08-31 RX ORDER — FENTANYL CITRATE 50 UG/ML
INJECTION, SOLUTION INTRAMUSCULAR; INTRAVENOUS AS NEEDED
Status: DISCONTINUED | OUTPATIENT
Start: 2020-08-31 | End: 2020-08-31

## 2020-08-31 RX ORDER — LIDOCAINE HYDROCHLORIDE 10 MG/ML
INJECTION, SOLUTION EPIDURAL; INFILTRATION; INTRACAUDAL; PERINEURAL AS NEEDED
Status: DISCONTINUED | OUTPATIENT
Start: 2020-08-31 | End: 2020-08-31

## 2020-08-31 RX ORDER — METOCLOPRAMIDE HYDROCHLORIDE 5 MG/ML
10 INJECTION INTRAMUSCULAR; INTRAVENOUS ONCE
Status: COMPLETED | OUTPATIENT
Start: 2020-08-31 | End: 2020-08-31

## 2020-08-31 RX ORDER — MIDAZOLAM HYDROCHLORIDE 2 MG/2ML
INJECTION, SOLUTION INTRAMUSCULAR; INTRAVENOUS AS NEEDED
Status: DISCONTINUED | OUTPATIENT
Start: 2020-08-31 | End: 2020-08-31

## 2020-08-31 RX ORDER — ONDANSETRON 2 MG/ML
INJECTION INTRAMUSCULAR; INTRAVENOUS AS NEEDED
Status: DISCONTINUED | OUTPATIENT
Start: 2020-08-31 | End: 2020-08-31

## 2020-08-31 RX ORDER — KETOROLAC TROMETHAMINE 30 MG/ML
15 INJECTION, SOLUTION INTRAMUSCULAR; INTRAVENOUS ONCE
Status: COMPLETED | OUTPATIENT
Start: 2020-08-31 | End: 2020-08-31

## 2020-08-31 RX ORDER — MAGNESIUM HYDROXIDE 1200 MG/15ML
LIQUID ORAL AS NEEDED
Status: DISCONTINUED | OUTPATIENT
Start: 2020-08-31 | End: 2020-08-31 | Stop reason: HOSPADM

## 2020-08-31 RX ORDER — DEXAMETHASONE SODIUM PHOSPHATE 10 MG/ML
INJECTION, SOLUTION INTRAMUSCULAR; INTRAVENOUS AS NEEDED
Status: DISCONTINUED | OUTPATIENT
Start: 2020-08-31 | End: 2020-08-31

## 2020-08-31 RX ORDER — FENTANYL CITRATE/PF 50 MCG/ML
25 SYRINGE (ML) INJECTION
Status: DISCONTINUED | OUTPATIENT
Start: 2020-08-31 | End: 2020-08-31 | Stop reason: HOSPADM

## 2020-08-31 RX ORDER — ACETAMINOPHEN 325 MG/1
650 TABLET ORAL EVERY 6 HOURS PRN
Status: DISCONTINUED | OUTPATIENT
Start: 2020-08-31 | End: 2020-08-31 | Stop reason: HOSPADM

## 2020-08-31 RX ORDER — KETOROLAC TROMETHAMINE 30 MG/ML
15 INJECTION, SOLUTION INTRAMUSCULAR; INTRAVENOUS EVERY 6 HOURS PRN
Status: DISCONTINUED | OUTPATIENT
Start: 2020-08-31 | End: 2020-08-31 | Stop reason: HOSPADM

## 2020-08-31 RX ORDER — ONDANSETRON 2 MG/ML
4 INJECTION INTRAMUSCULAR; INTRAVENOUS ONCE AS NEEDED
Status: DISCONTINUED | OUTPATIENT
Start: 2020-08-31 | End: 2020-08-31 | Stop reason: HOSPADM

## 2020-08-31 RX ORDER — PROPOFOL 10 MG/ML
INJECTION, EMULSION INTRAVENOUS AS NEEDED
Status: DISCONTINUED | OUTPATIENT
Start: 2020-08-31 | End: 2020-08-31

## 2020-08-31 RX ORDER — OXYCODONE HYDROCHLORIDE AND ACETAMINOPHEN 5; 325 MG/1; MG/1
1 TABLET ORAL ONCE
Status: DISCONTINUED | OUTPATIENT
Start: 2020-08-31 | End: 2020-08-31

## 2020-08-31 RX ORDER — HYDROMORPHONE HCL/PF 1 MG/ML
0.5 SYRINGE (ML) INJECTION ONCE
Status: COMPLETED | OUTPATIENT
Start: 2020-08-31 | End: 2020-08-31

## 2020-08-31 RX ORDER — ONDANSETRON 2 MG/ML
4 INJECTION INTRAMUSCULAR; INTRAVENOUS ONCE
Status: COMPLETED | OUTPATIENT
Start: 2020-08-31 | End: 2020-08-31

## 2020-08-31 RX ORDER — TAMSULOSIN HYDROCHLORIDE 0.4 MG/1
0.4 CAPSULE ORAL ONCE
Status: COMPLETED | OUTPATIENT
Start: 2020-08-31 | End: 2020-08-31

## 2020-08-31 RX ORDER — SODIUM CHLORIDE 9 MG/ML
125 INJECTION, SOLUTION INTRAVENOUS CONTINUOUS
Status: DISCONTINUED | OUTPATIENT
Start: 2020-08-31 | End: 2020-08-31 | Stop reason: HOSPADM

## 2020-08-31 RX ORDER — CEFDINIR 300 MG/1
300 CAPSULE ORAL EVERY 12 HOURS SCHEDULED
Qty: 18 CAPSULE | Refills: 0 | Status: SHIPPED | OUTPATIENT
Start: 2020-08-31 | End: 2020-09-09

## 2020-08-31 RX ORDER — SODIUM CHLORIDE, SODIUM LACTATE, POTASSIUM CHLORIDE, CALCIUM CHLORIDE 600; 310; 30; 20 MG/100ML; MG/100ML; MG/100ML; MG/100ML
INJECTION, SOLUTION INTRAVENOUS CONTINUOUS PRN
Status: DISCONTINUED | OUTPATIENT
Start: 2020-08-31 | End: 2020-08-31

## 2020-08-31 RX ORDER — CALCIUM CARBONATE 200(500)MG
1000 TABLET,CHEWABLE ORAL DAILY PRN
Status: DISCONTINUED | OUTPATIENT
Start: 2020-08-31 | End: 2020-08-31 | Stop reason: HOSPADM

## 2020-08-31 RX ORDER — PROMETHAZINE HYDROCHLORIDE 25 MG/ML
12.5 INJECTION, SOLUTION INTRAMUSCULAR; INTRAVENOUS ONCE AS NEEDED
Status: DISCONTINUED | OUTPATIENT
Start: 2020-08-31 | End: 2020-08-31 | Stop reason: HOSPADM

## 2020-08-31 RX ORDER — HYDROMORPHONE HCL/PF 1 MG/ML
0.2 SYRINGE (ML) INJECTION
Status: DISCONTINUED | OUTPATIENT
Start: 2020-08-31 | End: 2020-08-31 | Stop reason: HOSPADM

## 2020-08-31 RX ADMIN — SODIUM CHLORIDE 1000 ML: 0.9 INJECTION, SOLUTION INTRAVENOUS at 02:18

## 2020-08-31 RX ADMIN — KETOROLAC TROMETHAMINE 15 MG: 30 INJECTION, SOLUTION INTRAMUSCULAR at 06:22

## 2020-08-31 RX ADMIN — SODIUM CHLORIDE 125 ML/HR: 0.9 INJECTION, SOLUTION INTRAVENOUS at 04:33

## 2020-08-31 RX ADMIN — METOCLOPRAMIDE HYDROCHLORIDE 10 MG: 5 INJECTION INTRAMUSCULAR; INTRAVENOUS at 03:02

## 2020-08-31 RX ADMIN — ONDANSETRON 4 MG: 2 INJECTION INTRAMUSCULAR; INTRAVENOUS at 02:16

## 2020-08-31 RX ADMIN — TAMSULOSIN HYDROCHLORIDE 0.4 MG: 0.4 CAPSULE ORAL at 04:31

## 2020-08-31 RX ADMIN — KETOROLAC TROMETHAMINE 15 MG: 30 INJECTION, SOLUTION INTRAMUSCULAR at 02:20

## 2020-08-31 RX ADMIN — FENTANYL CITRATE 50 MCG: 50 INJECTION INTRAMUSCULAR; INTRAVENOUS at 16:05

## 2020-08-31 RX ADMIN — DEXAMETHASONE SODIUM PHOSPHATE 4 MG: 10 INJECTION, SOLUTION INTRAMUSCULAR; INTRAVENOUS at 16:08

## 2020-08-31 RX ADMIN — CEFTRIAXONE SODIUM 1000 MG: 10 INJECTION, POWDER, FOR SOLUTION INTRAVENOUS at 04:35

## 2020-08-31 RX ADMIN — PROPOFOL 200 MG: 10 INJECTION, EMULSION INTRAVENOUS at 16:01

## 2020-08-31 RX ADMIN — SODIUM CHLORIDE, SODIUM LACTATE, POTASSIUM CHLORIDE, AND CALCIUM CHLORIDE: .6; .31; .03; .02 INJECTION, SOLUTION INTRAVENOUS at 15:58

## 2020-08-31 RX ADMIN — KETOROLAC TROMETHAMINE 15 MG: 30 INJECTION, SOLUTION INTRAMUSCULAR at 10:30

## 2020-08-31 RX ADMIN — IOHEXOL 100 ML: 350 INJECTION, SOLUTION INTRAVENOUS at 02:47

## 2020-08-31 RX ADMIN — MIDAZOLAM HYDROCHLORIDE 2 MG: 1 INJECTION, SOLUTION INTRAMUSCULAR; INTRAVENOUS at 15:56

## 2020-08-31 RX ADMIN — HYDROMORPHONE HYDROCHLORIDE 0.5 MG: 1 INJECTION, SOLUTION INTRAMUSCULAR; INTRAVENOUS; SUBCUTANEOUS at 03:05

## 2020-08-31 RX ADMIN — LIDOCAINE HYDROCHLORIDE 50 MG: 10 INJECTION, SOLUTION EPIDURAL; INFILTRATION; INTRACAUDAL; PERINEURAL at 16:01

## 2020-08-31 RX ADMIN — FENTANYL CITRATE 50 MCG: 50 INJECTION INTRAMUSCULAR; INTRAVENOUS at 16:11

## 2020-08-31 RX ADMIN — ONDANSETRON 4 MG: 2 INJECTION INTRAMUSCULAR; INTRAVENOUS at 16:08

## 2020-08-31 NOTE — ASSESSMENT & PLAN NOTE
· She does not appear to be in acute exacerbation at this time  · Does not on any home inhalers    ·  monitor respiratory status

## 2020-08-31 NOTE — ANESTHESIA POSTPROCEDURE EVALUATION
Post-Op Assessment Note    CV Status:  Stable  Pain Score: 0    Pain management: adequate     Mental Status:  Alert and awake   Hydration Status:  Euvolemic   PONV Controlled:  Controlled   Airway Patency:  Patent      Post Op Vitals Reviewed: Yes      Staff: CRNA   Comments: arousable, following commands and verbally responsive  Denies any discomfort at this time  RRR, Nonlabored, VSS  No complications documented      BP   117/70   Temp   97 3   Pulse  114   Resp   16   SpO2   100%

## 2020-08-31 NOTE — UTILIZATION REVIEW
Initial Clinical Review    Admission: Date/Time/Statement:   Admission Orders (From admission, onward)     Ordered        08/31/20 0334  Place in Observation (expected length of stay for this patient is less than two midnights)  Once                   Orders Placed This Encounter   Procedures    Place in Observation (expected length of stay for this patient is less than two midnights)     Standing Status:   Standing     Number of Occurrences:   1     Order Specific Question:   Admitting Physician     Answer:   Bonifacio Lennon [[id number]]     Order Specific Question:   Level of Care     Answer:   Med Surg [[id number]]     ED Arrival Information     Expected Arrival Acuity Means of Arrival Escorted By Service Admission Type    - 8/31/2020 01:07 Urgent Walk-In Family Member Hospitalist Urgent    Arrival Complaint    back pain        Chief Complaint   Patient presents with    Flank Pain     Pt presents to ED from home w/ right flank pain starting two days ago  Pt (+) hx kidney stones  HPI:    35 y o  female who presents with nephrolithiasis  No significant past medical history  She presents to the emergency department for ongoing right-sided flank pain extending into her groin which began initially on Friday and was intermittent  She states that her pain became constant yesterday and awoke her from her sleep  She states that it was initially in her right flank and gradually his progress into her groin  She rates her pain at approximately a 10/10 before receiving pain medications in the emergency department  ED provider reached out to on-call urology who recommended ceftriaxone given patient had findings of possible pyelonephritis seen on CT scan  She appears systemically well and denies any fevers or urinary symptoms at this time  Physical exam: R CVA tenderness:      Plan: Admit to Observation for evaluation and treatment of nephrolithiasis:  NPO, IV fluids, pain management      8/31 Urology consult:    Initiate aggressive IVFs  Flomax  Analgesia/Narcotics  Anti-emetics  ATBs empirically while awaiting culture  Strain urine   NPO  for OR if no spontaneous expulsion achieved        ED Triage Vitals   Temperature Pulse Respirations Blood Pressure SpO2   08/31/20 0115 08/31/20 0115 08/31/20 0115 08/31/20 0115 08/31/20 0115   97 7 °F (36 5 °C) 92 16 140/85 98 %      Temp Source Heart Rate Source Patient Position - Orthostatic VS BP Location FiO2 (%)   08/31/20 0115 08/31/20 0308 08/31/20 0308 08/31/20 0308 --   Oral Monitor Lying Right arm       Pain Score       08/31/20 0115       6          Wt Readings from Last 1 Encounters:   08/31/20 72 2 kg (159 lb 2 8 oz)     Additional Vital Signs:     Date/Time   Temp   Pulse   Resp   BP   MAP (mmHg)   SpO2   O2 Device    08/31/20 1459   97 9   81   20   121/71      96 %   None (Room air)    08/31/20 0633   98 4   76   18   108/57   76   96 %   None (Room air)    08/31/20 0605      88   16   111/70   84   99 %   None (Room air)    08/31/20 0534      92   16   122/74      98 %   None (Room air)    08/31/20 0400      80   16   119/85   99   97 %   None (Room air)    08/31/20 0308      87   18   114/75   89   98 %   None (Room air)            Pertinent Labs/Diagnostic Test Results:     8/31 CT AP:  There is moderate right hydronephrosis related to a 6 x 5 x 4 mm calculus within the proximal right ureter, just beyond the right ureteropelvic junction  Additionally, there are findings suggesting urinary tract infection/pyelonephritis, as described above  Please see discussion  Correlation with urinalysis and urine culture and sensitivity is recommended  Small left renal cyst   No left hydronephrosis  Small hiatal hernia  No evidence of bowel obstruction  Normal appendix  There is a 2 5 cm cyst versus dominant follicle on the left ovary  There is a small amount of free fluid in the cul-de-sac      Results from last 7 days   Lab Units 08/31/20  0346   SARS-COV-2  Negative Results from last 7 days   Lab Units 08/31/20  0629 08/31/20  0210   WBC Thousand/uL 14 86* 10 54*   HEMOGLOBIN g/dL 14 0 16 0*   HEMATOCRIT % 41 8 46 4*   PLATELETS Thousands/uL 186 216   NEUTROS ABS Thousands/µL  --  8 02*   BANDS PCT % 1  --          Results from last 7 days   Lab Units 08/31/20  0629 08/31/20  0210   SODIUM mmol/L 137 137   POTASSIUM mmol/L 3 7 3 4*   CHLORIDE mmol/L 107 104   CO2 mmol/L 24 24   ANION GAP mmol/L 6 9   BUN mg/dL 12 16   CREATININE mg/dL 0 65 0 70   EGFR ml/min/1 73sq m 120 117   CALCIUM mg/dL 7 7* 8 9     Results from last 7 days   Lab Units 08/31/20  0210   AST U/L 12   ALT U/L 25   ALK PHOS U/L 58   TOTAL PROTEIN g/dL 7 0   ALBUMIN g/dL 3 7   TOTAL BILIRUBIN mg/dL 0 40         Results from last 7 days   Lab Units 08/31/20  0629 08/31/20  0210   GLUCOSE RANDOM mg/dL 109 97       Results from last 7 days   Lab Units 08/31/20  0210   CK TOTAL U/L 41             Results from last 7 days   Lab Units 08/31/20  0210   PROTIME seconds 12 5   INR  0 92   PTT seconds 29             Results from last 7 days   Lab Units 08/31/20  0210   LACTIC ACID mmol/L 1 3       Results from last 7 days   Lab Units 08/31/20  0210   LIPASE u/L 160             Results from last 7 days   Lab Units 08/31/20  0151   CLARITY UA  Slightly Cloudy   COLOR UA  Yellow   SPEC GRAV UA  1 025   PH UA  6 0   GLUCOSE UA mg/dl Negative   KETONES UA mg/dl Negative   BLOOD UA  Large*   PROTEIN UA mg/dl 30 (1+)*   NITRITE UA  Negative   BILIRUBIN UA  Negative   UROBILINOGEN UA E U /dl 0 2   LEUKOCYTES UA  Small*   WBC UA /hpf 10-20*   RBC UA /hpf Innumerable*   BACTERIA UA /hpf Occasional   EPITHELIAL CELLS WET PREP /hpf Occasional       Results from last 7 days   Lab Units 08/31/20  0629   TOTAL COUNTED  100           ED Treatment:   Medication Administration from 08/31/2020 0107 to 08/31/2020 0612       Date/Time Order Dose Route Action     08/31/2020 0218 sodium chloride 0 9 % bolus 1,000 mL 1,000 mL Intravenous New Bag     08/31/2020 0216 ondansetron (ZOFRAN) injection 4 mg 4 mg Intravenous Given     08/31/2020 0220 ketorolac (TORADOL) injection 15 mg 15 mg Intravenous Given     08/31/2020 0301 oxyCODONE-acetaminophen (PERCOCET) 5-325 mg per tablet 1 tablet 1 tablet Oral Not Given     08/31/2020 0247 iohexol (OMNIPAQUE) 350 MG/ML injection (MULTI-DOSE) 100 mL 100 mL Intravenous Given     08/31/2020 0302 metoclopramide (REGLAN) injection 10 mg 10 mg Intravenous Given     08/31/2020 0305 HYDROmorphone (DILAUDID) injection 0 5 mg 0 5 mg Intravenous Given     08/31/2020 0435 ceftriaxone (ROCEPHIN) 1 g/50 mL in dextrose IVPB 1,000 mg Intravenous New Bag     08/31/2020 0431 tamsulosin (FLOMAX) capsule 0 4 mg 0 4 mg Oral Given     08/31/2020 0433 sodium chloride 0 9 % infusion 125 mL/hr Intravenous New Bag        Past Medical History:   Diagnosis Date    Anxiety     Asthma     Chronic kidney disease     stones    Migraine     Mood disorder (HCC)     Panic anxiety syndrome     [facility] attempt ([facility] [address] )     Varicella     childhood     Present on Admission:   Asthma      Admitting Diagnosis: Back pain [M54 9]  Pyelonephritis [N12]  Nausea & vomiting [R11 2]  Ureteral stone with hydronephrosis [N13 2]  Age/Sex: 35 y o  female       Admission Orders: strain all urine, NPO      Scheduled Medications:    [START ON 9/1/2020] cefTRIAXone, 1,000 mg, Intravenous, Q24H      Continuous IV Infusions:    sodium chloride, 125 mL/hr, Intravenous, Continuous      PRN Meds:  acetaminophen, 650 mg, Oral, Q6H PRN  calcium carbonate, 1,000 mg, Oral, Daily PRN  ketorolac, 15 mg, Intravenous, Q6H PRN  oxyCODONE, 5 mg, Oral, Q6H PRN        IP CONSULT TO UROLOGY    Network Utilization Review Department  [email or web address]  ATTENTION: Please call with any questions or concerns to [phone] and carefully listen to the prompts so that you are directed to the right person   All voicemails are confidential   [facility] all requests for admission clinical reviews, approved or denied determinations and any other requests to dedicated fax number below belonging to the campus where the patient is receiving treatment   List of dedicated fax numbers for the Facilities:  [address] [place] [address] DENIALS (Administrative/Medical Necessity) [phone]   [address]  (Maternity/NICU/Pediatrics) [phone]   [facility] [phone]     [address] [phone]   Luca Giron [phone]   Sangita Ortez [phone]   [address] [place] [address] [phone]   [facility]  [phone]   [address] [facility]  [address] [facility] [address] [facility] [phone]

## 2020-08-31 NOTE — OP NOTE
OPERATIVE REPORT  PATIENT NAME: Maribel Smith    :  1990  MRN: [id number]  Pt Location: AN OR ROOM 03    SURGERY DATE: 2020    Surgeon(s) and Role:     * Sirisha Harding MD - Primary    Preop Diagnosis:  Ureteral stone with hydronephrosis [N13 2]    Post-Op Diagnosis Codes:     * Ureteral stone with hydronephrosis [N13 2]    Procedure(s) (LRB):  CYSTOSCOPY RETROGRADE PYELOGRAM WITH INSERTION STENT URETERAL (Right)    Specimen(s):  * No specimens in log *    Estimated Blood Loss:   Minimal    Drains:  * No LDAs found *    Anesthesia Type:   General    Operative Indications:  Ureteral stone with hydronephrosis [N13 2]      Operative Findings:  Stone appears disimpacted and in collecting system  Ureteral stent placed  Complications:   None    Procedure and Technique:  The patient was identified, brought to the operating room, and placed on the table in supine position  After induction of general anesthesia, the patient was placed in dorsal lithotomy position and prepped and draped in the usual sterile fashion  A complete formal timeout was performed  The 25 [place] rigid cystoscope was placed per urethra and cystoscopy was performed  There was no bladder abnormality identified   film reveals what appears to be the calculus more peripherally, likely dislodged and in the collecting system or midpole calyx  The Right ureteral orifice was identified and cannulated with a Solo wire  5 Fr catheter was placed, and a retrograde pyelogram was performed delineating the upper urinary tract anatomy  The Solo wire was replaced and a 26 cm x 6 [place] double-J stent was placed without string  The patient tolerated the procedure well and was transferred to the recovery room awake alert and in stable condition         I was present for the entire procedure    Patient Disposition:  PACU     Plan:  Patient understands that she will require second-stage ureteroscopy after at least 2 weeks to allow for adequate treatment of any urinary tract infection  This will be arranged by my office        SIGNATURE: Shana Ludwig MD  DATE: August 31, 2020  TIME: 4:23 PM

## 2020-08-31 NOTE — ASSESSMENT & PLAN NOTE
· Seen on CT scan which showed There is moderate right hydronephrosis related to a 6 x 5 x 4 mm calculus within the proximal right ureter, just beyond the right ureteropelvic junction  Additionally, there are findings suggesting urinary tract infection/pyelonephritis  · Does not appear to be any outward evidence of pyelonephritis is patient denies any fevers, and she does not have white blood cell count  · Per nephrology will continue ceftriaxone for now  · NPO for possible urologic procedure  · IV fluids  · Pain management

## 2020-08-31 NOTE — DISCHARGE SUMMARY
Discharge- Sita Luisr 1990, 35 y o  female MRN: [id number]    Unit/Bed#: S -01 Encounter: [id number]    Primary Care Provider: No primary care provider on file  Date and time admitted to hospital: 8/31/2020  1:13 AM        * Nephrolithiasis  Assessment & Plan  · Seen on CT scan which showed There is moderate right hydronephrosis related to a 6 x 5 x 4 mm calculus within the proximal right ureter, just beyond the right ureteropelvic junction  Additionally, there are findings suggesting urinary tract infection/pyelonephritis  · Does not appear to be any outward evidence of pyelonephritis is patient denies any fevers, and she does not have white blood cell count  · Empiric ceftriaxone per urology   · Pt underwent cystoscopy with stent placement; plan for staged procedure in approximately 2 weeks  · Discharged on 9 additional days of oral antibiotics      Discharging Physician / Practitioner: Jerome Judd MD  PCP: No primary care provider on file  Admission Date:   Admission Orders (From admission, onward)     Ordered        08/31/20 0334  Place in Observation (expected length of stay for this patient is less than two midnights)  Once                   Discharge Date: 08/31/20    Resolved Problems  Date Reviewed: 8/31/2020    None          Consultations During Hospital Stay:  · Urology     Procedures Performed:   · Cystoscopy with stent placement     Significant Findings / Test Results:   · CT abd:  IMPRESSION:     There is moderate right hydronephrosis related to a 6 x 5 x 4 mm calculus within the proximal right ureter, just beyond the right ureteropelvic junction  Additionally, there are findings suggesting urinary tract infection/pyelonephritis, as described   above  Please see discussion  Correlation with urinalysis and urine culture and sensitivity is recommended      Small left renal cyst   No left hydronephrosis      Small hiatal hernia  No evidence of bowel obstruction    Normal appendix      There is a 2 5 cm cyst versus dominant follicle on the left ovary  There is a small amount of free fluid in the cul-de-sac  Incidental Findings:   · See above regarding cysts on ovary and left renal      Test Results Pending at Discharge (will require follow up): · None      Outpatient Tests Requested:  · Follow-up urology     Complications:  None     Reason for Admission: flank pain     Hospital Course:     Ellie Bullock is a 35 y o  female patient who originally presented to the hospital on 8/31/2020 due to right-sided flank pain associated with nausea  CT scan showed moderate right-sided hydronephrosis with a 6 x 5 x 4 mm calculus within the proximal right ureter  Patient underwent cystoscopy with ureteral stent placement  Urology recommends stage procedure given possible pyelonephritis  Patient discharged on 9 additional days of Omnicef b i d  Will follow-up urology as an outpatient        Please see above list of diagnoses and related plan for additional information  Condition at Discharge: fair     Discharge Day Visit / Exam:     Subjective:  Feels well, no pain or nausea  Vitals: Blood Pressure: 111/77 (08/31/20 1705)  Pulse: 77 (08/31/20 1705)  Temperature: 98 °F (36 7 °C) (08/31/20 1705)  Temp Source: Oral (08/31/20 1705)  Respirations: 16 (08/31/20 1705)  Height: 5' 4" (162 6 cm) (08/31/20 4538)  Weight - Scale: 72 2 kg (159 lb 2 8 oz) (08/31/20 0633)  SpO2: 100 % (08/31/20 1705)  Exam:   Patient not seen or examined; refer to prior notes    Discharge instructions/Information to patient and family:   See after visit summary for information provided to patient and family  Provisions for Follow-Up Care:  See after visit summary for information related to follow-up care and any pertinent home health orders  Disposition:     Home        Planned Readmission:  None     Discharge Statement:  I spent 35 minutes discharging the patient   This time was spent on the day of discharge  I had direct contact with the patient on the day of discharge  Greater than 50% of the total time was spent examining patient, answering all patient questions, arranging and discussing plan of care with patient as well as directly providing post-discharge instructions  Additional time then spent on discharge activities  Discharge Medications:  See after visit summary for reconciled discharge medications provided to patient and family        ** Please Note: This note has been constructed using a voice recognition system **

## 2020-08-31 NOTE — TELEPHONE ENCOUNTER
Patient has right ureteral stent without string, placed 8/31  She will require second-stage ureteroscopy    Please add to stent list

## 2020-08-31 NOTE — ANESTHESIA PREPROCEDURE EVALUATION
Procedure:  CYSTOSCOPY RETROGRADE PYELOGRAM WITH INSERTION STENT URETERAL (Right Bladder)    Relevant Problems   ANESTHESIA   (+) PONV (postoperative nausea and vomiting)      /RENAL   (+) Nephrolithiasis      NEURO/PSYCH  h/o suicide attempt   (+) Anxiety   (+) Headache (migraines)      PULMONARY   (+) Asthma (exercise induced asthma)   (+) Smoking        Physical Exam    Airway    Mallampati score: II  TM Distance: >3 FB  Neck ROM: full     Dental       Cardiovascular  Rhythm: regular, Rate: normal,     Pulmonary  Breath sounds clear to auscultation,     Other Findings        Anesthesia Plan  ASA Score- 2     Anesthesia Type- general with ASA Monitors  Additional Monitors:   Airway Plan: LMA  Plan Factors-Exercise tolerance (METS): >4 METS  Chart reviewed  Patient is a current smoker  Induction- intravenous  Postoperative Plan- Plan for postoperative opioid use  Informed Consent- Anesthetic plan and risks discussed with patient  I personally reviewed this patient with the CRNA  Discussed and agreed on the Anesthesia Plan with the JOSSE Bray

## 2020-08-31 NOTE — H&P
H&P- Sharyne Claude 1990, 35 y o  female MRN: [id number]    Unit/Bed#: ED 23 Encounter: [id number]    Primary Care Provider: No primary care provider on file  Date and time admitted to hospital: 8/31/2020  1:13 AM      * Nephrolithiasis  Assessment & Plan  · Seen on CT scan which showed There is moderate right hydronephrosis related to a 6 x 5 x 4 mm calculus within the proximal right ureter, just beyond the right ureteropelvic junction  Additionally, there are findings suggesting urinary tract infection/pyelonephritis  · Does not appear to be any outward evidence of pyelonephritis is patient denies any fevers, and she does not have white blood cell count  · Per nephrology will continue ceftriaxone for now  · NPO for possible urologic procedure  · IV fluids  · Pain management  Asthma  Assessment & Plan  · She does not appear to be in acute exacerbation at this time  · Does not on any home inhalers  ·  monitor respiratory status        VTE Prophylaxis: Low risk ambulate  / reason for no mechanical VTE prophylaxis Low risk   Code Status:  Full code  POLST: There is no POLST form on file for this patient (pre-hospital)  Discussion with family:  Patient    Anticipated Length of Stay:  Patient will be admitted on an Observation basis with an anticipated length of stay of  < 2 midnights  Justification for Hospital Stay:  Nephrolithiasis of possible pyelonephritis    Total Time for Visit, including Counseling / Coordination of Care: 1 hour  Greater than 50% of this total time spent on direct patient counseling and coordination of care  Chief Complaint:   Abdominal pain    History of Present Illness:    Sharyne Claude is a 35 y o  female who presents with nephrolithiasis  No significant past medical history  She presents to the emergency department for ongoing right-sided flank pain extending into her groin which began initially on Friday and was intermittent    She states that her pain became constant yesterday and awoke her from her sleep  She states that it was initially in her right flank and gradually his progress into her groin  She rates her pain at approximately a 10/10 before receiving pain medications in the emergency department  ED provider reached out to on-call urology who recommended ceftriaxone given patient had findings of possible pyelonephritis seen on CT scan  She appears systemically well and denies any fevers or urinary symptoms at this time  Review of Systems:    Review of Systems   Constitutional: Negative for chills, fatigue, fever and unexpected weight change  Respiratory: Negative for cough, chest tightness and shortness of breath  Cardiovascular: Negative for chest pain and palpitations  Gastrointestinal: Negative for abdominal pain, diarrhea, nausea and vomiting  Genitourinary: Positive for flank pain  Negative for decreased urine volume, dysuria, frequency and urgency  Musculoskeletal: Positive for back pain  Negative for arthralgias  Neurological: Negative for dizziness, syncope, light-headedness and headaches  All other systems reviewed and are negative  Past Medical and Surgical History:     Past Medical History:   Diagnosis Date    Anxiety     Asthma     Chronic kidney disease     stones    Migraine     Mood disorder ([facility] )     Panic anxiety syndrome     Suicide attempt ([facility] )     Varicella     childhood       Past Surgical History:   Procedure Laterality Date    DENTAL SURGERY N/A 2011    SHOULDER SURGERY         Meds/Allergies:    Prior to Admission medications    Medication Sig Start Date End Date Taking? Authorizing Provider   multivitamin (THERAGRAN) TABS Take 1 tablet by mouth daily    Historical Provider, MD     I have reviewed home medications with patient personally  Allergies:    Allergies   Allergen Reactions    Pollen Extract        Social History:     Marital Status: Single   Occupation: unknown   Patient Pre-hospital Living Situation: lives at home   Patient Pre-hospital Level of Mobility: ambulates   Patient Pre-hospital Diet Restrictions: npo  Substance Use History:   Social History     Substance and Sexual Activity   Alcohol Use Yes     Social History     Tobacco Use   Smoking Status Current Every Day Smoker    Packs/day: 0 50    Years: 9 00    Pack years: 4 50    Types: Cigarettes    Last attempt to quit: 4/20/2017    Years since quitting: 3 3   Smokeless Tobacco Never Used     Social History     Substance and Sexual Activity   Drug Use No       Family History:    Family History   Problem Relation Age of Onset    Alcohol abuse Father     Kidney disease Father     Asthma Maternal Grandmother     Cancer Maternal Grandmother     COPD Maternal Grandmother     Cancer Maternal Grandfather     Diabetes Paternal Grandmother     Alcohol abuse Paternal Grandfather     Cancer Paternal Grandfather        Physical Exam:     Vitals:   Blood Pressure: 119/85 (08/31/20 0400)  Pulse: 80 (08/31/20 0400)  Temperature: 97 7 °F (36 5 °C) (08/31/20 0115)  Temp Source: Oral (08/31/20 0115)  Respirations: 16 (08/31/20 0400)  Height: 5' 4" (162 6 cm) (08/31/20 0115)  Weight - Scale: 72 8 kg (160 lb 7 9 oz) (08/31/20 0115)  SpO2: 97 % (08/31/20 0400)    Physical Exam  Constitutional:       General: She is not in acute distress  Appearance: She is not diaphoretic  HENT:      Head: Normocephalic and atraumatic  Mouth/Throat:      Mouth: Mucous membranes are moist       Pharynx: Oropharynx is clear  No oropharyngeal exudate  Eyes:      General:         Right eye: No discharge  Left eye: No discharge  Conjunctiva/sclera: Conjunctivae normal       Pupils: Pupils are equal, round, and reactive to light  Neck:      Musculoskeletal: Neck supple  No muscular tenderness  Cardiovascular:      Rate and Rhythm: Normal rate and regular rhythm  Pulses: Normal pulses  Heart sounds: Normal heart sounds   No murmur  Pulmonary:      Effort: Pulmonary effort is normal  No respiratory distress  Breath sounds: Normal breath sounds  No wheezing or rales  Abdominal:      General: Abdomen is flat  There is no distension  Palpations: Abdomen is soft  Tenderness: There is no abdominal tenderness  There is right CVA tenderness  There is no left CVA tenderness  Musculoskeletal: Normal range of motion  Right lower leg: No edema  Left lower leg: No edema  Skin:     General: Skin is warm and dry  Capillary Refill: Capillary refill takes less than 2 seconds  Coloration: Skin is not jaundiced  Neurological:      General: No focal deficit present  Mental Status: She is alert and oriented to person, place, and time  Cranial Nerves: No cranial nerve deficit  Psychiatric:         Mood and Affect: Mood normal            Additional Data:     Lab Results: I have personally reviewed pertinent reports  Results from last 7 days   Lab Units 08/31/20  0210   WBC Thousand/uL 10 54*   HEMOGLOBIN g/dL 16 0*   HEMATOCRIT % 46 4*   PLATELETS Thousands/uL 216   NEUTROS PCT % 77*   LYMPHS PCT % 13*   MONOS PCT % 9   EOS PCT % 1     Results from last 7 days   Lab Units 08/31/20  0210   SODIUM mmol/L 137   POTASSIUM mmol/L 3 4*   CHLORIDE mmol/L 104   CO2 mmol/L 24   BUN mg/dL 16   CREATININE mg/dL 0 70   ANION GAP mmol/L 9   CALCIUM mg/dL 8 9   ALBUMIN g/dL 3 7   TOTAL BILIRUBIN mg/dL 0 40   ALK PHOS U/L 58   ALT U/L 25   AST U/L 12   GLUCOSE RANDOM mg/dL 97     Results from last 7 days   Lab Units 08/31/20  0210   INR  0 92             Results from last 7 days   Lab Units 08/31/20  0210   LACTIC ACID mmol/L 1 3       Imaging: I have personally reviewed pertinent reports        CT abdomen pelvis with contrast   ED Interpretation by Ten Turk PA-C (08/31 0315)   Jacek Lala MD  [phone]  8/31/2020      Narrative & Impression     CT ABDOMEN AND PELVIS WITH IV CONTRAST     INDICATION: Right flank pain with nausea vomiting; dysuria  Right CVA tenderness, right lower quadrant tenderness      COMPARISON:  None available      TECHNIQUE:  CT examination of the abdomen and pelvis was performed  Axial, sagittal, and coronal 2D reformatted images were created from the source data and submitted for interpretation      Radiation dose length product (DLP) for this visit:  372 mGy-cm   This examination, like all CT scans performed in the [facility], was performed utilizing techniques to minimize radiation dose exposure, including the use of iterative   reconstruction and automated exposure control      IV Contrast:  100 mL of iohexol (OMNIPAQUE)  Enteric Contrast:  Enteric contrast was not administered      FINDINGS:     ABDOMEN     LOWER CHEST:  There is a small hiatal hernia      LIVER/BILIARY TREE:  Unremarkable      GALLB   LADDER:  No calcified gallstones  No pericholecystic inflammatory change      SPLEEN:  Unremarkable      PANCREAS:  Unremarkable      ADRENAL GLANDS:  Unremarkable      KIDNEYS/URETERS:  There is a 6 x 5 x 4 mm calculus in the proximal right ureter, just beyond the right ureteropelvic junction  The right kidney is swollen and demonstrates a mildly delayed right nephrogram   There is moderate right hydronephrosis  There is some stranding and fluid adjacent to the right kidney and tracking down the right ureter  The proximal to mid right ureter is dilated and demonstrates urothelial enhancement, suggesting urinary tract infection/pyelonephritis  Correlation with   urinalysis and urine culture and sensitivity is recommended      There is a 1 cm cyst in the upper pole left kidney  There is no left hydronephrosis      STOMACH AND BOWEL:  There is a small hiatal hernia  There is no evidence of bowel obstruction      APPENDIX:  A normal appendix was visualized      ABDOMINOPELVIC CAV   ITY:  As described above    No pneumoperitoneum      VESSELS:  Unremarkable for patient's age      PELVIS     REPRODUCTIVE ORGANS:  There is a 2 5 cm cyst versus dominant follicle on the left ovary  There is a small amount of free fluid in the cul-de-sac      URINARY BLADDER:  The urinary bladder wall appears mildly thickened      ABDOMINAL WALL/INGUINAL REGIONS:  Tiny fat-containing umbilical hernia      OSSEOUS STRUCTURES:  No acute fracture or destructive osseous lesion      IMPRESSION:     There is moderate right hydronephrosis related to a 6 x 5 x 4 mm calculus within the proximal right ureter, just beyond the right ureteropelvic junction  Additionally, there are findings suggesting urinary tract infection/pyelonephritis, as described   above  Please see discussion  Correlation with urinalysis and urine culture and sensitivity is recommended      Small left renal cyst   No left hydronephrosis      Small hiatal hernia  No evidence of bowel obstruction  Normal appendix      Th   ere is a 2 5 cm cyst versus dominant follicle on the left ovary  There is a small amount of free fluid in the cul-de-sac            Workstation performed: [id number]             Final Result by Angelica Campos MD (08/31 0080)      There is moderate right hydronephrosis related to a 6 x 5 x 4 mm calculus within the proximal right ureter, just beyond the right ureteropelvic junction  Additionally, there are findings suggesting urinary tract infection/pyelonephritis, as described    above  Please see discussion  Correlation with urinalysis and urine culture and sensitivity is recommended  Small left renal cyst   No left hydronephrosis  Small hiatal hernia  No evidence of bowel obstruction  Normal appendix  There is a 2 5 cm cyst versus dominant follicle on the left ovary  There is a small amount of free fluid in the cul-de-sac              Workstation performed: [id number]             EKG, Pathology, and Other Studies Reviewed on Admission:   · All labs reviewed    [facility] Yes     ** Please Note: This note has been constructed using a voice recognition system   **

## 2020-08-31 NOTE — ED PROVIDER NOTES
History  Chief Complaint   Patient presents with    Flank Pain     Pt presents to ED from home w/ right flank pain starting two days ago  Pt (+) hx kidney stones  Patient is a 80-year-old female with history of CKD, migraine and no significant past surgical history that presents emergency department with sharp and shooting persistent worsening right flank pain with radiation to right lower quadrant of abdomen for 3 days  Patient has associated symptomatology of nausea vomiting symptoms beginning with the current ED presentation of right flank pain  Patient states that she has a history kidney stones and current pain feels similar to previous experienced  Patient stated that she had vomited 2-3 times daily beginning with the current ED presentation of right flank pain with each episode of vomitus of undigested food with no bilious or bleeding material on each episode  Patient denies palliative factors with provocative factors of pressure to right flank and right lower quadrant of abdomen  Patient denies not effective treatment  Patient denies fevers, chills, diarrhea, and constipation  Patient states that she had some urinary when symptoms began, she has no urinary burning at this time  Patient denies vaginal bleeding and vaginal discharge  Patient denies recent fall or recent trauma  Patient denies sick contacts or recent travel  Patient denies chest pain and shortness of breath        History provided by:  Patient   used: No    Flank Pain   Pain location:  R flank  Pain quality: sharp and shooting    Pain radiates to:  RLQ  Pain severity:  Mild  Onset quality:  Gradual  Duration:  3 days  Timing:  Intermittent  Progression:  Worsening  Chronicity:  New  Context: not alcohol use, not awakening from sleep, not diet changes, not laxative use, not previous surgeries, not recent illness, not recent sexual activity, not retching, not sick contacts and not suspicious food intake Relieved by:  Nothing  Worsened by:  Palpation and position changes  Ineffective treatments:  None tried  Associated symptoms: nausea and vomiting    Associated symptoms: no anorexia, no belching, no chest pain, no chills, no constipation, no cough, no diarrhea, no dysuria, no fatigue, no fever, no flatus, no shortness of breath, no sore throat, no vaginal bleeding and no vaginal discharge    Nausea:     Severity:  Mild    Onset quality:  Gradual    Duration:  3 days    Timing:  Constant    Progression:  Unchanged  Vomiting:     Quality:  Undigested food    Number of occurrences:  2-3/times day    Severity:  Mild    Duration:  3 days    Timing:  Intermittent    Progression:  Unchanged  Risk factors: no alcohol abuse, no aspirin use, has not had multiple surgeries, no NSAID use, not pregnant and no recent hospitalization        Prior to Admission Medications   Prescriptions Last Dose Informant Patient Reported? Taking?   multivitamin (THERAGRAN) TABS  Self Yes No   Sig: Take 1 tablet by mouth daily      Facility-Administered Medications: None       Past Medical History:   Diagnosis Date    Anxiety     Asthma     Chronic kidney disease     stones    Migraine     Mood disorder (HCC)     Panic anxiety syndrome     [facility]     Varicella     childhood       Past Surgical History:   Procedure Laterality Date    DENTAL SURGERY N/A 2011    SHOULDER SURGERY         Family History   Problem Relation Age of Onset    Alcohol abuse Father     Kidney disease Father     Asthma Maternal Grandmother     Cancer Maternal Grandmother     COPD Maternal Grandmother     Cancer Maternal Grandfather     Diabetes Paternal Grandmother     Alcohol abuse Paternal Grandfather     Cancer Paternal Grandfather      I have reviewed and agree with the history as documented      E-Cigarette/Vaping    E-Cigarette Use Never User      E-Cigarette/Vaping Substances     Social History     Tobacco Use    Smoking status: Current Every Day Smoker     Packs/day: 0 50     Years: 9 00     Pack years: 4 50     Types: Cigarettes     Last attempt to quit: 4/20/2017     Years since quitting: 3 3    Smokeless tobacco: Never Used   Substance Use Topics    Alcohol use: Yes    Drug use: No       Review of Systems   Constitutional: Negative for activity change, appetite change, chills, fatigue and fever  HENT: Negative for congestion, postnasal drip, rhinorrhea, sinus pressure, sinus pain, sore throat and tinnitus  Eyes: Negative for photophobia and visual disturbance  Respiratory: Negative for cough, chest tightness and shortness of breath  Cardiovascular: Negative for chest pain and palpitations  Gastrointestinal: Positive for nausea and vomiting  Negative for abdominal pain, anorexia, constipation, diarrhea and flatus  Genitourinary: Positive for flank pain  Negative for difficulty urinating, dysuria, frequency, urgency, vaginal bleeding and vaginal discharge  Musculoskeletal: Negative for back pain, gait problem, neck pain and neck stiffness  Skin: Negative for pallor and rash  Allergic/Immunologic: Negative for environmental allergies and food allergies  Neurological: Negative for dizziness, weakness, numbness and headaches  Psychiatric/Behavioral: Negative for confusion  All other systems reviewed and are negative  Physical Exam  Physical Exam  Vitals signs and nursing note reviewed  Constitutional:       General: She is awake  Appearance: Normal appearance  She is well-developed  She is not ill-appearing, toxic-appearing or diaphoretic  Comments: /75 (BP Location: Right arm)   Pulse 87   Temp 97 7 °F (36 5 °C) (Oral)   Resp 18   Ht 5' 4" (1 626 m)   Wt 72 8 kg (160 lb 7 9 oz)   SpO2 98%   BMI 27 55 kg/m²      HENT:      Head: Normocephalic and atraumatic  Right Ear: Hearing and external ear normal  No decreased hearing noted  No drainage, swelling or tenderness   No mastoid tenderness  Left Ear: Hearing and external ear normal  No decreased hearing noted  No drainage, swelling or tenderness  No mastoid tenderness  Nose: Nose normal       Mouth/Throat:      Lips: Pink  Mouth: Mucous membranes are moist       Pharynx: Oropharynx is clear  Uvula midline  Eyes:      General: Lids are normal  Vision grossly intact  Right eye: No discharge  Left eye: No discharge  Extraocular Movements: Extraocular movements intact  Conjunctiva/sclera: Conjunctivae normal       Pupils: Pupils are equal, round, and reactive to light  Neck:      Musculoskeletal: Full passive range of motion without pain, normal range of motion and neck supple  Normal range of motion  No neck rigidity, spinous process tenderness or muscular tenderness  Vascular: No JVD  Trachea: Trachea and phonation normal  No tracheal tenderness or tracheal deviation  Cardiovascular:      Rate and Rhythm: Normal rate and regular rhythm  Pulses: Normal pulses  Radial pulses are 2+ on the right side and 2+ on the left side  Posterior tibial pulses are 2+ on the right side and 2+ on the left side  Heart sounds: Normal heart sounds  Pulmonary:      Effort: Pulmonary effort is normal       Breath sounds: Normal breath sounds  No stridor  No decreased breath sounds, wheezing, rhonchi or rales  Chest:      Chest wall: No tenderness  Abdominal:      General: Abdomen is flat  Bowel sounds are normal  There is no distension  Palpations: Abdomen is soft  Abdomen is not rigid  Tenderness: There is abdominal tenderness in the right lower quadrant and suprapubic area  There is right CVA tenderness  There is no guarding or rebound  Musculoskeletal: Normal range of motion  Lymphadenopathy:      Head:      Right side of head: No submental, submandibular, tonsillar, preauricular, posterior auricular or occipital adenopathy        Left side of head: No submental, submandibular, tonsillar, preauricular, posterior auricular or occipital adenopathy  Cervical: No cervical adenopathy  Right cervical: No superficial, deep or posterior cervical adenopathy  Left cervical: No superficial, deep or posterior cervical adenopathy  Skin:     General: Skin is warm  Capillary Refill: Capillary refill takes less than 2 seconds  Neurological:      General: No focal deficit present  Mental Status: She is alert and oriented to person, place, and time  GCS: GCS eye subscore is 4  GCS verbal subscore is 5  GCS motor subscore is 6  Sensory: No sensory deficit  Deep Tendon Reflexes: Reflexes are normal and symmetric  Reflex Scores:       Patellar reflexes are 2+ on the right side and 2+ on the left side  Psychiatric:         Mood and Affect: Mood normal          Speech: Speech normal          Behavior: Behavior normal  Behavior is cooperative  Thought Content:  Thought content normal          Judgment: Judgment normal          Vital Signs  ED Triage Vitals   Temperature Pulse Respirations Blood Pressure SpO2   08/31/20 0115 08/31/20 0115 08/31/20 0115 08/31/20 0115 08/31/20 0115   97 7 °F (36 5 °C) 92 16 140/85 98 %      Temp Source Heart Rate Source Patient Position - Orthostatic VS BP Location FiO2 (%)   08/31/20 0115 08/31/20 0308 08/31/20 0308 08/31/20 0308 --   Oral Monitor Lying Right arm       Pain Score       08/31/20 0115       6           Vitals:    08/31/20 0115 08/31/20 0308 08/31/20 0400   BP: 140/85 114/75 119/85   Pulse: 92 87 80   Patient Position - Orthostatic VS:  Lying Lying         Visual Acuity      ED Medications  Medications   ceftriaxone (ROCEPHIN) 1 g/50 mL in dextrose IVPB (has no administration in time range)   tamsulosin (FLOMAX) capsule 0 4 mg (has no administration in time range)   sodium chloride 0 9 % bolus 1,000 mL (0 mL Intravenous Stopped 8/31/20 0330)   ondansetron (ZOFRAN) injection 4 mg (4 mg Intravenous Given 8/31/20 0216)   ketorolac (TORADOL) injection 15 mg (15 mg Intravenous Given 8/31/20 0220)   iohexol (OMNIPAQUE) 350 MG/ML injection (MULTI-DOSE) 100 mL (100 mL Intravenous Given 8/31/20 0247)   metoclopramide (REGLAN) injection 10 mg (10 mg Intravenous Given 8/31/20 0302)   HYDROmorphone (DILAUDID) injection 0 5 mg (0 5 mg Intravenous Given 8/31/20 0305)       Diagnostic Studies  Results Reviewed     Procedure Component Value Units Date/Time    Blood culture #1 [[id number]] Collected:  08/31/20 0354    Lab Status: In process Specimen:  Blood from Arm, Left Updated:  08/31/20 0402    Novel Coronavirus Olga Peoples [[id number]] Collected:  08/31/20 0345    Lab Status: In process Specimen:  Nares from Nose Updated:  08/31/20 0349    Blood culture #2 [[id number]]     Lab Status:  No result Specimen:  Blood     Lactic acid [[id number]]  (Normal) Collected:  08/31/20 0210    Lab Status:  Final result Specimen:  Blood from Arm, Left Updated:  08/31/20 0237     LACTIC ACID 1 3 mmol/L     Narrative:       Result may be elevated if tourniquet was used during collection      Comprehensive metabolic panel [[id number]]  (Abnormal) Collected:  08/31/20 0210    Lab Status:  Final result Specimen:  Blood from Arm, Left Updated:  08/31/20 0235     Sodium 137 mmol/L      Potassium 3 4 mmol/L      Chloride 104 mmol/L      CO2 24 mmol/L      ANION GAP 9 mmol/L      BUN 16 mg/dL      Creatinine 0 70 mg/dL      Glucose 97 mg/dL      Calcium 8 9 mg/dL      AST 12 U/L      ALT 25 U/L      Alkaline Phosphatase 58 U/L      Total Protein 7 0 g/dL      Albumin 3 7 g/dL      Total Bilirubin 0 40 mg/dL      eGFR 117 ml/min/1 73sq m     Narrative:       Elvin guidelines for Chronic Kidney Disease (CKD):     Stage 1 with normal or high GFR (GFR > 90 mL/min/1 73 square meters)    Stage 2 Mild CKD (GFR = 60-89 mL/min/1 73 square meters)    Stage 3A Moderate CKD (GFR = 45-59 mL/min/1 73 square meters)    Stage 3B Moderate CKD (GFR = 30-44 mL/min/1 73 square meters)    Stage 4 Severe CKD (GFR = 15-29 mL/min/1 73 square meters)    Stage 5 End Stage CKD (GFR <15 mL/min/1 73 square meters)  Note: GFR calculation is accurate only with a steady state creatinine    Lipase [[id number]]  (Normal) Collected:  08/31/20 0210    Lab Status:  Final result Specimen:  Blood from Arm, Left Updated:  08/31/20 0235     Lipase 160 u/L     CK Total with Reflex CKMB [[id number]]  (Normal) Collected:  08/31/20 0210    Lab Status:  Final result Specimen:  Blood from Arm, Left Updated:  08/31/20 0235     Total CK 41 U/L     Protime-INR [[id number]]  (Normal) Collected:  08/31/20 0210    Lab Status:  Final result Specimen:  Blood from Arm, Left Updated:  08/31/20 0229     Protime 12 5 seconds      INR 0 92    APTT [[id number]]  (Normal) Collected:  08/31/20 0210    Lab Status:  Final result Specimen:  Blood from Arm, Left Updated:  08/31/20 0229     PTT 29 seconds     CBC and differential [[id number]]  (Abnormal) Collected:  08/31/20 0210    Lab Status:  Final result Specimen:  Blood from Arm, Left Updated:  08/31/20 0218     WBC 10 54 Thousand/uL      RBC 4 99 Million/uL      Hemoglobin 16 0 g/dL      Hematocrit 46 4 %      MCV 93 fL      MCH 32 1 pg      MCHC 34 5 g/dL      RDW 11 7 %      MPV 10 2 fL      Platelets 889 Thousands/uL      nRBC 0 /100 WBCs      Neutrophils Relative 77 %      Immat GRANS % 0 %      Lymphocytes Relative 13 %      Monocytes Relative 9 %      Eosinophils Relative 1 %      Basophils Relative 0 %      Neutrophils Absolute 8 02 Thousands/µL      Immature Grans Absolute 0 03 Thousand/uL      Lymphocytes Absolute 1 39 Thousands/µL      Monocytes Absolute 0 99 Thousand/µL      Eosinophils Absolute 0 08 Thousand/µL      Basophils Absolute 0 03 Thousands/µL     Urine Microscopic [[id number]]  (Abnormal) Collected:  08/31/20 0151    Lab Status:  Final result Specimen:  Urine, Clean Catch Updated:  08/31/20 7939 RBC, UA Innumerable /hpf      WBC, UA 10-20 /hpf      Epithelial Cells Occasional /hpf      Bacteria, UA Occasional /hpf     Urine culture [[id number]] Collected:  08/31/20 0151    Lab Status: In process Specimen:  Urine, Clean Catch Updated:  08/31/20 0209    POCT pregnancy, urine [[id number]]  (Normal) Resulted:  08/31/20 0150    Lab Status:  Final result Updated:  08/31/20 0205     EXT PREG TEST UR (Ref: Negative) Negative     Control Valid    UA w Reflex to Microscopic w Reflex to Culture [[id number]]  (Abnormal) Collected:  08/31/20 0151    Lab Status:  Final result Specimen:  Urine, Clean Catch Updated:  08/31/20 0200     Color, UA Yellow     Clarity, UA Slightly Cloudy     Specific Gravity, UA 1 025     pH, UA 6 0     Leukocytes, UA Small     Nitrite, UA Negative     Protein, UA 30 (1+) mg/dl      Glucose, UA Negative mg/dl      Ketones, UA Negative mg/dl      Urobilinogen, UA 0 2 E U /dl      Bilirubin, UA Negative     Blood, UA Large                 CT abdomen pelvis with contrast   ED Interpretation by Yenifer Dan PA-C (08/31 0315)   Jose Chaidez MD  [phone]  8/31/2020      Narrative & Impression     CT ABDOMEN AND PELVIS WITH IV CONTRAST     INDICATION:   Right flank pain with nausea vomiting; dysuria  Right CVA tenderness, right lower quadrant tenderness      COMPARISON:  None available      TECHNIQUE:  CT examination of the abdomen and pelvis was performed  Axial, sagittal, and coronal 2D reformatted images were created from the source data and submitted for interpretation      Radiation dose length product (DLP) for this visit:  372 mGy-cm     This examination, like all CT scans performed in the [facility], was performed utilizing techniques to minimize radiation dose exposure, including the use of iterative   reconstruction and automated exposure control      IV Contrast:  100 mL of iohexol (OMNIPAQUE)  Enteric Contrast:  Enteric contrast was not administered      FINDINGS:     ABDOMEN     LOWER CHEST:  There is a small hiatal hernia      LIVER/BILIARY TREE:  Unremarkable      GALLB   LADDER:  No calcified gallstones  No pericholecystic inflammatory change      SPLEEN:  Unremarkable      PANCREAS:  Unremarkable      ADRENAL GLANDS:  Unremarkable      KIDNEYS/URETERS:  There is a 6 x 5 x 4 mm calculus in the proximal right ureter, just beyond the right ureteropelvic junction  The right kidney is swollen and demonstrates a mildly delayed right nephrogram   There is moderate right hydronephrosis  There is some stranding and fluid adjacent to the right kidney and tracking down the right ureter  The proximal to mid right ureter is dilated and demonstrates urothelial enhancement, suggesting urinary tract infection/pyelonephritis  Correlation with   urinalysis and urine culture and sensitivity is recommended      There is a 1 cm cyst in the upper pole left kidney  There is no left hydronephrosis      STOMACH AND BOWEL:  There is a small hiatal hernia  There is no evidence of bowel obstruction      APPENDIX:  A normal appendix was visualized      ABDOMINOPELVIC CAV   ITY:  As described above  No pneumoperitoneum      VESSELS:  Unremarkable for patient's age      PELVIS     REPRODUCTIVE ORGANS:  There is a 2 5 cm cyst versus dominant follicle on the left ovary  There is a small amount of free fluid in the cul-de-sac      URINARY BLADDER:  The urinary bladder wall appears mildly thickened      ABDOMINAL WALL/INGUINAL REGIONS:  Tiny fat-containing umbilical hernia      OSSEOUS STRUCTURES:  No acute fracture or destructive osseous lesion      IMPRESSION:     There is moderate right hydronephrosis related to a 6 x 5 x 4 mm calculus within the proximal right ureter, just beyond the right ureteropelvic junction  Additionally, there are findings suggesting urinary tract infection/pyelonephritis, as described   above  Please see discussion    Correlation with urinalysis and urine culture and sensitivity is recommended      Small left renal cyst   No left hydronephrosis      Small hiatal hernia  No evidence of bowel obstruction  Normal appendix      Th   ere is a 2 5 cm cyst versus dominant follicle on the left ovary  There is a small amount of free fluid in the cul-de-sac            Workstation performed: [id number]             Final Result by Maria De Jesus Casper MD (08/31 6631)      There is moderate right hydronephrosis related to a 6 x 5 x 4 mm calculus within the proximal right ureter, just beyond the right ureteropelvic junction  Additionally, there are findings suggesting urinary tract infection/pyelonephritis, as described    above  Please see discussion  Correlation with urinalysis and urine culture and sensitivity is recommended  Small left renal cyst   No left hydronephrosis  Small hiatal hernia  No evidence of bowel obstruction  Normal appendix  There is a 2 5 cm cyst versus dominant follicle on the left ovary  There is a small amount of free fluid in the cul-de-sac              Workstation performed: [id number]                    Procedures  Procedures         ED Course  ED Course as of Aug 31 0424   Mon Aug 31, 2020   0205 PREGNANCY TEST URINE: Negative   0212 WBC, UA(!): 10-20   0212 RBC, UA(!): Innumerable   0315 CT abdomen pelvis with contrast-impression-"There is moderate right hydronephrosis related to a 6 x 5 x 4 mm calculus within the proximal right ureter, just beyond the right ureteropelvic junction   Additionally, there are findings suggesting urinary tract infection/pyelonephritis, as described   above   Please see discussion   Correlation with urinalysis and urine culture and sensitivity is recommended        Small left renal cyst   No left hydronephrosis        Small hiatal hernia   No evidence of bowel obstruction   Normal appendix        Th   ere is a 2 5 cm cyst versus dominant follicle on the left ovary  Marguerite Sexton is a yisel amount of free fluid in the cul-de-sac  "      0329 Discussed case with Dr Yo Hoffmann, Urology with indication to make patient NPO for OR                                                 MDM  Number of Diagnoses or Management Options  Nausea & vomiting: new and requires workup  Pyelonephritis: new and requires workup  Ureteral stone with hydronephrosis: new and requires workup     Amount and/or Complexity of Data Reviewed  Clinical lab tests: ordered and reviewed  Tests in the radiology section of CPT®: ordered and reviewed  Review and summarize past medical records: yes    Risk of Complications, Morbidity, and/or Mortality  Presenting problems: moderate  Diagnostic procedures: moderate  Management options: moderate    Patient Progress  Patient progress: stable    Patient is a 25-year-old female with history of CKD, migraine and no significant past surgical history that presents emergency department with sharp and shooting persistent worsening right flank pain with radiation to right lower quadrant of abdomen for 3 days  Patient has associated symptomatology of nausea vomiting symptoms beginning with the current ED presentation of right flank pain  Patient hemodynamically stable and afebrile  CT abdomen pelvis with contrast-impression-There is moderate right hydronephrosis related to a 6 x 5 x 4 mm calculus within the proximal right ureter, just beyond the right ureteropelvic junction  Additionally, there are findings suggesting urinary tract infection/pyelonephritis, as described   above  Please see discussion  Correlation with urinalysis and urine culture and sensitivity is recommended  Small left renal cyst   No left hydronephrosis  Small hiatal hernia  No evidence of bowel obstruction  Normal appendix  There is a 2 5 cm cyst versus dominant follicle on the left ovary    There is a small amount of free fluid in the cul-de-sac "  Slight leukocytosis with no banding, patient afebrile; no lactic acidosis with systemic infection on likely  Urinalysis with likely urinary tract infection; CT denotes suggestion pyelonephritis  Delivered Dilaudid, Toradol, Reglan, Zofran and Flomax patient verbalized decrease in nausea vomiting and right flank pain symptoms status post medication delivery  Discussed patient case with Dr Klarissa Reyna, Urology with indication to make patient NPO for OR tomorrow  Spoke with michelle Greene and both agreed to place patient in patient status under the care of Dr Rafa Hutchinson  Internal Medicine  Patient with verbal understanding all clinical laboratory imaging findings, admission instructions and verbalized agreement with current treatment plan    Disposition  Final diagnoses:   Ureteral stone with hydronephrosis - Right   Pyelonephritis   Nausea & vomiting     Time reflects when diagnosis was documented in both MDM as applicable and the Disposition within this note     Time User Action Codes Description Comment    8/31/2020  3:33 AM Gene Brook Add [N13 2] Ureteral stone with hydronephrosis     8/31/2020  3:33 AM Gene Brook Modify [N13 2] Ureteral stone with hydronephrosis Right    8/31/2020  3:33 AM Gene Brook Add [N12] Pyelonephritis     8/31/2020  3:33 AM Gene Brook Add [R11 2] Nausea & vomiting       ED Disposition     ED Disposition Condition Date/Time Comment    Admit Stable Mon Aug 31, 2020  3:32 AM Case was discussed with michelle Greene and the patient's admission status was agreed to be Admission Status: observation status to the service of Dr Rafa Hutchinson, Internal Medicine   Follow-up Information    None         Patient's Medications   Discharge Prescriptions    No medications on file     No discharge procedures on file      PDMP Review       Value Time User    PDMP Reviewed  Yes 8/31/2020  1:14 AM Maylin Gonzalez PA-C          ED Provider  Electronically Signed by           Maylin Gonzalez PA-C  08/31/20 0424

## 2020-08-31 NOTE — ASSESSMENT & PLAN NOTE
· Seen on CT scan which showed There is moderate right hydronephrosis related to a 6 x 5 x 4 mm calculus within the proximal right ureter, just beyond the right ureteropelvic junction  Additionally, there are findings suggesting urinary tract infection/pyelonephritis  · Does not appear to be any outward evidence of pyelonephritis is patient denies any fevers, and she does not have white blood cell count    · Empiric ceftriaxone per urology   · Pt underwent cystoscopy with stent placement; plan for staged procedure in approximately 2 weeks  · Discharged on 9 additional days of oral antibiotics

## 2020-08-31 NOTE — CONSULTS
CONSULT    Patient Name: Annabelle Pena  Patient MRN: [id number]  Date of Service: 8/31/2020   Date / Time Note Created: 8/31/2020 2:24 PM   Referring Provider: Milly Holm MD    Provider Creating Note: Pat Mayo    PCP: No primary care provider on file  Attending Provider:  Milly Holm MD    Reason for Consult: Flank Pain    HPI: Annabelle Pena is 36-year-old female with history of nephrolithiasis and spontaneous expulsion of requiring  evaluation, consultation or  surgical manipulation  She presented to [facility] Emergency room with right flank pain; not accompanied by fever, chills, dysuria gross hematuria  Patient experienced mild nausea  CT of the abdomen and pelvis demonstrated 6 x 5 x 4 millimeter right ureteral calculus with obstruction/hydronephrosis  Patient was admitted to the Internal Medicine service after IV fluids and narcotic pain medication failed to adequately controlled pain  Patient is afebrile hemodynamically stable but with positive increasing leukocytosis  Creatinine within normal limits  COVID negative  Lactic acid normal     Urologic consultation requested for possible surgical intervention  Active Problems:    Patient Active Problem List   Diagnosis    Asthma    Nephrolithiasis    Anxiety    Headache    Smoking            Impressions  · Right Ureteral Calculus--6 x 4 x 5 millimeter proximal  · Right Hydronephrosis  · Renal Colic    Recommendations  1  Initiate aggressive IVFs   2  Flomax  3  Analgesia/Narcotics   4  Anti-emetics   5  ATBs empirically while awaiting culture   6  Strain urine   7  NPO  for OR if no spontaneous expulsion achieved  Explained risk, benefits and potential complications of ureteroscopic stone extraction   Patient has verbalized understanding of possible need for ureteral stent only for any signs of infection and/or technical difficult prohibiting stone retrieval etc ; requiring staged ureteroscopy electively once recovered and infection free as OP  Formal consent by surgeon  Past Medical History:   Diagnosis Date    Anxiety     Asthma     Chronic kidney disease     stones    Migraine     Mood disorder (HCC)     Panic anxiety syndrome     Suicide attempt ([facility] [address] )     Varicella     childhood       Past Surgical History:   Procedure Laterality Date    DENTAL SURGERY N/A 2011    SHOULDER SURGERY         Family History   Problem Relation Age of Onset    Alcohol abuse Father     Kidney disease Father     Asthma Maternal Grandmother     Cancer Maternal Grandmother     COPD Maternal Grandmother     Cancer Maternal Grandfather     Diabetes Paternal Grandmother     Alcohol abuse Paternal Grandfather     Cancer Paternal Grandfather        Social History     Socioeconomic History    Marital status: Single     Spouse name: Not on file    Number of children: Not on file    Years of education: Not on file    Highest education level: Not on file   Occupational History    Not on file   Social Needs    Financial resource strain: Not on file    Food insecurity     Worry: Not on file     Inability: Not on file    Transportation needs     Medical: Not on file     Non-medical: Not on file   Tobacco Use    Smoking status: Current Every Day Smoker     Packs/day: 0 50     Years: 9 00     Pack years: 4 50     Types: Cigarettes     Last attempt to quit: 4/20/2017     Years since quitting: 3 3    Smokeless tobacco: Never Used   Substance and Sexual Activity    Alcohol use:  Yes    Drug use: No    Sexual activity: Yes     Partners: Male     Birth control/protection: None   Lifestyle    Physical activity     Days per week: Not on file     Minutes per session: Not on file    Stress: Not on file   Relationships    Social connections     Talks on phone: Not on file     Gets together: Not on file     Attends L[employer]-day service: Not on file     Active member of club or organization: Not on file     Attends meetings of clubs or organizations: Not on file     Relationship status: Not on file    Intimate partner violence     Fear of current or ex partner: Not on file     Emotionally abused: Not on file     Physically abused: Not on file     Forced sexual activity: Not on file   Other Topics Concern    Not on file   Social History Narrative    Not on file       Allergies   Allergen Reactions    Pollen Extract        Review of Systems  10 point review of systems negative except as noted in HPI  Constitutional:   negative for - chills or fever  Cardiovascular:   no chest pain or dyspnea on exertion  Gastrointestinal:   positive for - abdominal pain and nausea/vomiting  Genito-Urinary:   no dysuria, trouble voiding, or hematuria  Neurological:   no TIA or stroke symptoms     Chart Review   Allergies, current medications, history, problem list    Vital Signs  /57 (BP Location: Right arm)   Pulse 76   Temp 98 4 °F (36 9 °C) (Oral)   Resp 18   Ht 5' 4" (1 626 m)   Wt 72 2 kg (159 lb 2 8 oz)   SpO2 96%   BMI 27 32 kg/m²     Physical Exam  General appearance: alert and oriented, in no acute distress, appears stated age, cooperative and no distress  Head: Normocephalic, without obvious abnormality, atraumatic  Neck: no adenopathy, no carotid bruit, no JVD, supple, symmetrical, trachea midline and thyroid not enlarged, symmetric, no tenderness/mass/nodules  Lungs: diminished breath sounds  Heart: regular rate and rhythm, S1, S2 normal, no murmur, click, rub or gallop  Abdomen: soft, non-tender; bowel sounds normal; no masses,  no organomegaly  Extremities: extremities normal, warm and well-perfused; no cyanosis, clubbing, or edema  Pulses: 2+ and symmetric  Neurologic: Grossly normal  No urinary drains     Laboratory Studies  Lab Results   Component Value Date     08/06/2015    K 3 7 08/31/2020    K 4 1 08/06/2015     08/31/2020     08/06/2015    CO2 24 08/31/2020    CO2 26 0 08/06/2015    GLUCOSE 77 08/06/2015 CREATININE 0 65 08/31/2020    CREATININE 0 72 08/06/2015    BUN 12 08/31/2020    BUN 10 08/06/2015     Lab Results   Component Value Date    WBC 14 86 (H) 08/31/2020    WBC 6 85 08/06/2015    RBC 4 39 08/31/2020    RBC 4 78 08/06/2015    HGB 14 0 08/31/2020    HGB 15 4 08/06/2015    HCT 41 8 08/31/2020    HCT 46 2 (H) 08/06/2015    MCV 95 08/31/2020    MCV 97 08/06/2015    MCH 31 9 08/31/2020    MCH 32 2 08/06/2015    RDW 11 7 08/31/2020    RDW 13 0 08/06/2015     08/31/2020     08/06/2015         Imaging and Other Studies  )  Ct Abdomen Pelvis With Contrast    Result Date: 8/31/2020  Narrative: CT ABDOMEN AND PELVIS WITH IV CONTRAST INDICATION:   Right flank pain with nausea vomiting; dysuria  Right CVA tenderness, right lower quadrant tenderness  COMPARISON:  None available  TECHNIQUE:  CT examination of the abdomen and pelvis was performed  Axial, sagittal, and coronal 2D reformatted images were created from the source data and submitted for interpretation  Radiation dose length product (DLP) for this visit:  372 mGy-cm   This examination, like all CT scans performed in the [facility], was performed utilizing techniques to minimize radiation dose exposure, including the use of iterative reconstruction and automated exposure control  IV Contrast:  100 mL of iohexol (OMNIPAQUE) Enteric Contrast:  Enteric contrast was not administered  FINDINGS: ABDOMEN LOWER CHEST:  There is a small hiatal hernia  LIVER/BILIARY TREE:  Unremarkable  GALLBLADDER:  No calcified gallstones  No pericholecystic inflammatory change  SPLEEN:  Unremarkable  PANCREAS:  Unremarkable  ADRENAL GLANDS:  Unremarkable  KIDNEYS/URETERS:  There is a 6 x 5 x 4 mm calculus in the proximal right ureter, just beyond the right ureteropelvic junction  The right kidney is swollen and demonstrates a mildly delayed right nephrogram   There is moderate right hydronephrosis    There is some stranding and fluid adjacent to the right kidney and tracking down the right ureter  The proximal to mid right ureter is dilated and demonstrates urothelial enhancement, suggesting urinary tract infection/pyelonephritis  Correlation with urinalysis and urine culture and sensitivity is recommended  There is a 1 cm cyst in the upper pole left kidney  There is no left hydronephrosis  STOMACH AND BOWEL:  There is a small hiatal hernia  There is no evidence of bowel obstruction  APPENDIX:  A normal appendix was visualized  ABDOMINOPELVIC CAVITY:  As described above  No pneumoperitoneum  VESSELS:  Unremarkable for patient's age  PELVIS REPRODUCTIVE ORGANS:  There is a 2 5 cm cyst versus dominant follicle on the left ovary  There is a small amount of free fluid in the cul-de-sac  URINARY BLADDER:  The urinary bladder wall appears mildly thickened  ABDOMINAL WALL/INGUINAL REGIONS:  Tiny fat-containing umbilical hernia  OSSEOUS STRUCTURES:  No acute fracture or destructive osseous lesion  Impression: There is moderate right hydronephrosis related to a 6 x 5 x 4 mm calculus within the proximal right ureter, just beyond the right ureteropelvic junction  Additionally, there are findings suggesting urinary tract infection/pyelonephritis, as described above  Please see discussion  Correlation with urinalysis and urine culture and sensitivity is recommended  Small left renal cyst   No left hydronephrosis  Small hiatal hernia  No evidence of bowel obstruction  Normal appendix  There is a 2 5 cm cyst versus dominant follicle on the left ovary  There is a small amount of free fluid in the cul-de-sac   Workstation performed: [id number]       Medications   Current Facility-Administered Medications   Medication Dose Route Frequency Provider Last Rate    acetaminophen  650 mg Oral Q6H PRN Inessa Avila PA-C      calcium carbonate  1,000 mg Oral Daily PRN Wallace Ruelas PA-C      [START ON 9/1/2020] cefTRIAXone  1,000 mg Intravenous Q24H Wallace Ruelas NICCI      ketorolac  15 mg Intravenous Q6H PRN Neto Sun PA-C      oxyCODONE  5 mg Oral Q6H PRN Neto Sun PA-C      sodium chloride  125 mL/hr Intravenous Continuous Wallace Ruelas PA-C 125 mL/hr (08/31/20 0433)               YOBANY العلي

## 2020-08-31 NOTE — DISCHARGE INSTRUCTIONS
Abdominal pelvis CT with contrast-impression-  "There is moderate right hydronephrosis related to a 6 x 5 x 4 mm calculus within the proximal right ureter, just beyond the right ureteropelvic junction  Additionally, there are findings suggesting urinary tract infection/pyelonephritis, as described   above  Please see discussion  Correlation with urinalysis and urine culture and sensitivity is recommended  Small left renal cyst   No left hydronephrosis  Small hiatal hernia  No evidence of bowel obstruction  Normal appendix "     There is a 2 5 cm cyst versus dominant follicle on the left ovary  There is a small amount of free fluid in the cul-de-sac

## 2020-09-01 LAB — BACTERIA UR CULT: NORMAL

## 2020-09-01 NOTE — TELEPHONE ENCOUNTER
Patient called in requesting work note to be emailed stating she is able to return to work on 9/2/2020  [email or web address]

## 2020-09-01 NOTE — TELEPHONE ENCOUNTER
Post Op Note     Maribel Smith is a 35 y o  female s/pCYSTOSCOPY RETROGRADE PYELOGRAM WITH INSERTION STENT URETERAL (Right)  performed 8/31/2020 by Dr Elaine Sherman      How would you rate your pain on a scale from 1 to 10, 10 being the worst pain ever? 0  Have you had a fever? No  Have your bowel movements been regular? Yes  Do you have any difficulty urinating? No      Do you have any other questions or concerns that I can address at this time? No    Reviewed stent symptoms including flank pain, nausea, dysuria, and hematuria  Instructed to increase PO fluids  Take NSAIDS and other prescribed pain medication PRN  Encouraged to call with for uncontrolled pain and fever  Advised surgery scheduler will follow up to coordinate next procedure and work note emailed per patient request  Patient verbalized understanding

## 2020-09-01 NOTE — TELEPHONE ENCOUNTER
Post Op Note    Roseanna Agudelo is a 35 y o  female s/pCYSTOSCOPY RETROGRADE PYELOGRAM WITH INSERTION STENT URETERAL (Right)  performed 8/31/2020 by Dr Amy Loomis      Call placed to patient to assess recovery and advise that she will require a second stage ureteroscopy  Surgery scheduler to reach out to coordinate       Per patient request, work note emailed to [email or web address]  Awake alert to person only

## 2020-09-03 ENCOUNTER — TELEPHONE (OUTPATIENT)
Dept: UROLOGY | Facility: AMBULATORY SURGERY CENTER | Age: 30
End: 2020-09-03

## 2020-09-03 NOTE — TELEPHONE ENCOUNTER
Patient scheduled for 2nd stage Right URS 10/2 (patient request) @ [facility]  Her H&P will be 2 days passed 30 days  Will you update it or is OV needed? Please advise  Thank you!

## 2020-09-04 NOTE — TELEPHONE ENCOUNTER
Spoke to patient who is currently in stent status on right side after a procedure with Dr Alfredo Johns  She had concerns of pink urine today  She denies any nausea, vomiting, fever, chills  Slight constipation  I explained to her to rest, hydrate well, avoid constipation and to try a heating pad  She is currently taking Tylenol which stated seems to help  She was informed that physical activity and reduce fluid intake can increase hematuria and dysuria  We reviewed ER precautions and she is aware if she develops fever, vomiting, pain level of 5 or more, and/or inability to urinate she should go the ER  I told her I would pass this message onto the doctor to make them aware of our conversation and to verify if any further recommendations are needed  She indicated verbal understanding

## 2020-09-04 NOTE — TELEPHONE ENCOUNTER
Patient experiencing blood in her urine  Described as light pink      She can be reached at [phone]

## 2020-09-05 LAB
BACTERIA BLD CULT: NORMAL
BACTERIA BLD CULT: NORMAL

## 2020-09-14 ENCOUNTER — APPOINTMENT (OUTPATIENT)
Dept: LAB | Facility: CLINIC | Age: 30
End: 2020-09-14
Payer: COMMERCIAL

## 2020-09-14 DIAGNOSIS — N20.0 NEPHROLITHIASIS: ICD-10-CM

## 2020-09-14 PROCEDURE — 87086 URINE CULTURE/COLONY COUNT: CPT

## 2020-09-14 PROCEDURE — 87077 CULTURE AEROBIC IDENTIFY: CPT

## 2020-09-14 PROCEDURE — 87186 SC STD MICRODIL/AGAR DIL: CPT

## 2020-09-14 PROCEDURE — 87147 CULTURE TYPE IMMUNOLOGIC: CPT

## 2020-09-14 NOTE — TELEPHONE ENCOUNTER
Patient of [place] office  History of nephrolithiasis, s/p CYSTOSCOPY RETROGRADE PYELOGRAM WITH INSERTION STENT URETERAL (Right) in August 2020  She is scheduled for scheduled for 2nd stage Right URS 10/2 (patient request) @ [facility]  Call returned to patient  She states she is experiencing flank pain (3/10), fever of 100 2 with Motrin and chills  Denies dysuria, hematuria, other urinary symptoms  Patient is supposed to be getting pre op urine testing this week for upcoming surgery  Will route to provider to advise on any additional testing to be done due to symptoms  Will call patient back with plan

## 2020-09-14 NOTE — TELEPHONE ENCOUNTER
Call placed to patient, advised to have urine testing done today to rule out infection  Advised patient she may want to call the lab in advance to advise of temperature and recommendation by provider to proceed with testing  ER precautions reviewed  Patient verbalized understanding and agrees with plan

## 2020-09-14 NOTE — TELEPHONE ENCOUNTER
Possible UTI  Dr Piero Mccain    Patient believes they may have a possible UTI  Currently experiencing the following symptoms: flank pain, 100 1 fever, and chills      Patient can be reached at: [phone]

## 2020-09-15 ENCOUNTER — HOSPITAL ENCOUNTER (INPATIENT)
Facility: HOSPITAL | Age: 30
LOS: 2 days | Discharge: HOME | DRG: 872 | End: 2020-09-17
Attending: EMERGENCY MEDICINE | Admitting: FAMILY MEDICINE
Payer: COMMERCIAL

## 2020-09-15 ENCOUNTER — APPOINTMENT (EMERGENCY)
Dept: CT IMAGING | Facility: HOSPITAL | Age: 30
DRG: 872 | End: 2020-09-15
Payer: COMMERCIAL

## 2020-09-15 DIAGNOSIS — N12 PYELONEPHRITIS: Primary | ICD-10-CM

## 2020-09-15 DIAGNOSIS — A41.9 SEPSIS WITHOUT ACUTE ORGAN DYSFUNCTION, DUE TO UNSPECIFIED ORGANISM (HCC): ICD-10-CM

## 2020-09-15 DIAGNOSIS — N20.0 NEPHROLITHIASIS: ICD-10-CM

## 2020-09-15 LAB
ALBUMIN SERPL BCP-MCNC: 3.5 G/DL (ref 3.5–5)
ALP SERPL-CCNC: 74 U/L (ref 46–116)
ALT SERPL W P-5'-P-CCNC: 15 U/L (ref 12–78)
ANION GAP SERPL CALCULATED.3IONS-SCNC: 11 MMOL/L (ref 4–13)
AST SERPL W P-5'-P-CCNC: 12 U/L (ref 5–45)
BACTERIA UR QL AUTO: ABNORMAL /HPF
BASOPHILS # BLD AUTO: 0.03 THOUSANDS/ΜL (ref 0–0.1)
BASOPHILS NFR BLD AUTO: 0 % (ref 0–1)
BILIRUB SERPL-MCNC: 0.74 MG/DL (ref 0.2–1)
BILIRUB UR QL STRIP: NEGATIVE
BUN SERPL-MCNC: 8 MG/DL (ref 5–25)
CALCIUM SERPL-MCNC: 8.7 MG/DL (ref 8.3–10.1)
CHLORIDE SERPL-SCNC: 101 MMOL/L (ref 100–108)
CLARITY UR: CLEAR
CO2 SERPL-SCNC: 24 MMOL/L (ref 21–32)
COLOR UR: YELLOW
CREAT SERPL-MCNC: 0.91 MG/DL (ref 0.6–1.3)
EOSINOPHIL # BLD AUTO: 0 THOUSAND/ΜL (ref 0–0.61)
EOSINOPHIL NFR BLD AUTO: 0 % (ref 0–6)
ERYTHROCYTE [DISTWIDTH] IN BLOOD BY AUTOMATED COUNT: 11.5 % (ref 11.6–15.1)
EXT PREG TEST URINE: NEGATIVE
EXT. CONTROL ED NAV: NORMAL
GFR SERPL CREATININE-BSD FRML MDRD: 86 ML/MIN/1.73SQ M
GLUCOSE SERPL-MCNC: 141 MG/DL (ref 65–140)
GLUCOSE UR STRIP-MCNC: NEGATIVE MG/DL
HCT VFR BLD AUTO: 42.9 % (ref 34.8–46.1)
HGB BLD-MCNC: 14.6 G/DL (ref 11.5–15.4)
HGB UR QL STRIP.AUTO: ABNORMAL
IMM GRANULOCYTES # BLD AUTO: 0.05 THOUSAND/UL (ref 0–0.2)
IMM GRANULOCYTES NFR BLD AUTO: 0 % (ref 0–2)
KETONES UR STRIP-MCNC: ABNORMAL MG/DL
LACTATE SERPL-SCNC: 1.1 MMOL/L (ref 0.5–2)
LEUKOCYTE ESTERASE UR QL STRIP: ABNORMAL
LYMPHOCYTES # BLD AUTO: 0.41 THOUSANDS/ΜL (ref 0.6–4.47)
LYMPHOCYTES NFR BLD AUTO: 3 % (ref 14–44)
MCH RBC QN AUTO: 32.3 PG (ref 26.8–34.3)
MCHC RBC AUTO-ENTMCNC: 34 G/DL (ref 31.4–37.4)
MCV RBC AUTO: 95 FL (ref 82–98)
MONOCYTES # BLD AUTO: 0.93 THOUSAND/ΜL (ref 0.17–1.22)
MONOCYTES NFR BLD AUTO: 7 % (ref 4–12)
NEUTROPHILS # BLD AUTO: 12.6 THOUSANDS/ΜL (ref 1.85–7.62)
NEUTS SEG NFR BLD AUTO: 90 % (ref 43–75)
NITRITE UR QL STRIP: NEGATIVE
NON-SQ EPI CELLS URNS QL MICRO: ABNORMAL /HPF
NRBC BLD AUTO-RTO: 0 /100 WBCS
PH UR STRIP.AUTO: 6 [PH]
PLATELET # BLD AUTO: 203 THOUSANDS/UL (ref 149–390)
PMV BLD AUTO: 10.1 FL (ref 8.9–12.7)
POTASSIUM SERPL-SCNC: 3.6 MMOL/L (ref 3.5–5.3)
PROT SERPL-MCNC: 7.2 G/DL (ref 6.4–8.2)
PROT UR STRIP-MCNC: NEGATIVE MG/DL
RBC # BLD AUTO: 4.52 MILLION/UL (ref 3.81–5.12)
RBC #/AREA URNS AUTO: ABNORMAL /HPF
SODIUM SERPL-SCNC: 136 MMOL/L (ref 136–145)
SP GR UR STRIP.AUTO: <=1.005 (ref 1–1.03)
UROBILINOGEN UR QL STRIP.AUTO: 0.2 E.U./DL
WBC # BLD AUTO: 14.02 THOUSAND/UL (ref 4.31–10.16)
WBC #/AREA URNS AUTO: ABNORMAL /HPF

## 2020-09-15 PROCEDURE — 99284 EMERGENCY DEPT VISIT MOD MDM: CPT

## 2020-09-15 PROCEDURE — G1004 CDSM NDSC: HCPCS

## 2020-09-15 PROCEDURE — 74177 CT ABD & PELVIS W/CONTRAST: CPT

## 2020-09-15 PROCEDURE — 96376 TX/PRO/DX INJ SAME DRUG ADON: CPT

## 2020-09-15 PROCEDURE — 85025 COMPLETE CBC W/AUTO DIFF WBC: CPT | Performed by: PSYCHIATRY & NEUROLOGY

## 2020-09-15 PROCEDURE — 36415 COLL VENOUS BLD VENIPUNCTURE: CPT | Performed by: PSYCHIATRY & NEUROLOGY

## 2020-09-15 PROCEDURE — 87040 BLOOD CULTURE FOR BACTERIA: CPT | Performed by: PSYCHIATRY & NEUROLOGY

## 2020-09-15 PROCEDURE — 83605 ASSAY OF LACTIC ACID: CPT | Performed by: PSYCHIATRY & NEUROLOGY

## 2020-09-15 PROCEDURE — 96365 THER/PROPH/DIAG IV INF INIT: CPT

## 2020-09-15 PROCEDURE — 96366 THER/PROPH/DIAG IV INF ADDON: CPT

## 2020-09-15 PROCEDURE — 99223 1ST HOSP IP/OBS HIGH 75: CPT | Performed by: FAMILY MEDICINE

## 2020-09-15 PROCEDURE — 81025 URINE PREGNANCY TEST: CPT | Performed by: PSYCHIATRY & NEUROLOGY

## 2020-09-15 PROCEDURE — 80053 COMPREHEN METABOLIC PANEL: CPT | Performed by: PSYCHIATRY & NEUROLOGY

## 2020-09-15 PROCEDURE — 99285 EMERGENCY DEPT VISIT HI MDM: CPT | Performed by: EMERGENCY MEDICINE

## 2020-09-15 PROCEDURE — 96375 TX/PRO/DX INJ NEW DRUG ADDON: CPT

## 2020-09-15 PROCEDURE — 81001 URINALYSIS AUTO W/SCOPE: CPT | Performed by: PSYCHIATRY & NEUROLOGY

## 2020-09-15 RX ORDER — ACETAMINOPHEN 325 MG/1
650 TABLET ORAL EVERY 6 HOURS PRN
Status: DISCONTINUED | OUTPATIENT
Start: 2020-09-15 | End: 2020-09-17 | Stop reason: HOSPADM

## 2020-09-15 RX ORDER — KETOROLAC TROMETHAMINE 30 MG/ML
15 INJECTION, SOLUTION INTRAMUSCULAR; INTRAVENOUS EVERY 6 HOURS PRN
Status: DISCONTINUED | OUTPATIENT
Start: 2020-09-16 | End: 2020-09-17 | Stop reason: HOSPADM

## 2020-09-15 RX ORDER — ONDANSETRON 2 MG/ML
4 INJECTION INTRAMUSCULAR; INTRAVENOUS ONCE
Status: COMPLETED | OUTPATIENT
Start: 2020-09-15 | End: 2020-09-15

## 2020-09-15 RX ORDER — HYDROMORPHONE HCL/PF 1 MG/ML
0.5 SYRINGE (ML) INJECTION ONCE
Status: COMPLETED | OUTPATIENT
Start: 2020-09-15 | End: 2020-09-15

## 2020-09-15 RX ORDER — IBUPROFEN 400 MG/1
400 TABLET ORAL ONCE
Status: COMPLETED | OUTPATIENT
Start: 2020-09-15 | End: 2020-09-15

## 2020-09-15 RX ORDER — SODIUM CHLORIDE 9 MG/ML
125 INJECTION, SOLUTION INTRAVENOUS CONTINUOUS
Status: DISCONTINUED | OUTPATIENT
Start: 2020-09-15 | End: 2020-09-17 | Stop reason: HOSPADM

## 2020-09-15 RX ORDER — ONDANSETRON 2 MG/ML
4 INJECTION INTRAMUSCULAR; INTRAVENOUS EVERY 6 HOURS PRN
Status: DISCONTINUED | OUTPATIENT
Start: 2020-09-15 | End: 2020-09-17

## 2020-09-15 RX ORDER — KETOROLAC TROMETHAMINE 30 MG/ML
15 INJECTION, SOLUTION INTRAMUSCULAR; INTRAVENOUS ONCE
Status: COMPLETED | OUTPATIENT
Start: 2020-09-15 | End: 2020-09-15

## 2020-09-15 RX ADMIN — ONDANSETRON 4 MG: 2 INJECTION INTRAMUSCULAR; INTRAVENOUS at 18:12

## 2020-09-15 RX ADMIN — HYDROMORPHONE HYDROCHLORIDE 0.5 MG: 1 INJECTION, SOLUTION INTRAMUSCULAR; INTRAVENOUS; SUBCUTANEOUS at 19:31

## 2020-09-15 RX ADMIN — ACETAMINOPHEN 650 MG: 325 TABLET, FILM COATED ORAL at 20:50

## 2020-09-15 RX ADMIN — IOHEXOL 100 ML: 350 INJECTION, SOLUTION INTRAVENOUS at 16:02

## 2020-09-15 RX ADMIN — SODIUM CHLORIDE 125 ML/HR: 0.9 INJECTION, SOLUTION INTRAVENOUS at 20:50

## 2020-09-15 RX ADMIN — KETOROLAC TROMETHAMINE 15 MG: 30 INJECTION, SOLUTION INTRAMUSCULAR at 18:12

## 2020-09-15 RX ADMIN — MORPHINE SULFATE 2 MG: 2 INJECTION, SOLUTION INTRAMUSCULAR; INTRAVENOUS at 21:01

## 2020-09-15 RX ADMIN — SODIUM CHLORIDE 1000 ML: 0.9 INJECTION, SOLUTION INTRAVENOUS at 16:18

## 2020-09-15 RX ADMIN — CEFTRIAXONE SODIUM 1000 MG: 10 INJECTION, POWDER, FOR SOLUTION INTRAVENOUS at 16:22

## 2020-09-15 RX ADMIN — ONDANSETRON 4 MG: 2 INJECTION INTRAMUSCULAR; INTRAVENOUS at 21:02

## 2020-09-15 RX ADMIN — KETOROLAC TROMETHAMINE 15 MG: 30 INJECTION, SOLUTION INTRAMUSCULAR at 16:15

## 2020-09-15 RX ADMIN — IBUPROFEN 400 MG: 400 TABLET ORAL at 14:32

## 2020-09-15 NOTE — ED ATTENDING ATTESTATION
9/15/2020  IClayton MD, saw and evaluated the patient  I have discussed the patient with the resident/non-physician practitioner and agree with the resident's/non-physician practitioner's findings, Plan of Care, and MDM as documented in the resident's/non-physician practitioner's note, except where noted  All available labs and Radiology studies were reviewed  I was present for key portions of any procedure(s) performed by the resident/non-physician practitioner and I was immediately available to provide assistance  At this point I agree with the current assessment done in the Emergency Department  I have conducted an independent evaluation of this patient a history and physical is as follows:    ED Course         Critical Care Time  Procedures    Patient seen independently and in conjunction with medical resident  Clinical presentation consistent with radiographic findings on CT of right pyelonephritis  IV Rocephin ordered along with urine blood cultures  Patient to be admitted to the hospitalist service

## 2020-09-15 NOTE — ASSESSMENT & PLAN NOTE
Patient has a probable pyelonephritis  Continue ceftriaxone  Sepsis present on admission  Patient is tachycardic  She has fever and a leukocytosis  Follow-up with urine cultures from yesterday although not that remarkable given but colony count was less than 100,000  Will continue follow-up with sensitivities however  Follow-up with blood cultures as well

## 2020-09-15 NOTE — ASSESSMENT & PLAN NOTE
Patient was to have a stage procedure  Had a stent placed 2 weeks ago  On October she is scheduled for further procedure  Will make the patient NPO after midnight and have Urology evaluate the patient

## 2020-09-15 NOTE — H&P
H&P- [facility] 1990, 35 y o  female MRN: [id number]    Unit/Bed#: ED 28 Encounter: [id number]    Primary Care Provider: No primary care provider on file  Date and time admitted to hospital: 9/15/2020  2:10 PM        * Pyelonephritis  Assessment & Plan  Patient has a probable pyelonephritis  Continue ceftriaxone  Sepsis present on admission  Patient is tachycardic  She has fever and a leukocytosis  Follow-up with urine cultures from yesterday although not that remarkable given but colony count was less than 100,000  Will continue follow-up with sensitivities however  Follow-up with blood cultures as well  Headache  Assessment & Plan  Patient has history of migraines  She thinks this could just be related to the infection  Nephrolithiasis  Assessment & Plan  Patient was to have a stage procedure  Had a stent placed 2 weeks ago  On October she is scheduled for further procedure  Will make the patient NPO after midnight and have Urology evaluate the patient  Patient will likely only require medical management at this point during this hospitalization but will see what they say  VTE Prophylaxis: Enoxaparin (Lovenox)  / sequential compression device   Code Status:  Full code  POLST: POLST is not applicable to this patient      Anticipated Length of Stay:  Patient will be admitted on an Inpatient basis with an anticipated length of stay of  greater than 2 midnights  Justification for Hospital Stay:  Patient is going to require greater than 2 midnights secondary to being a female patient with a history stones who comes in with pyelonephritis in need of IV antibiotics and follow-up with blood cultures as well as possible urological intervention  Total Time for Visit, including Counseling / Coordination of Care: 45 minutes  Greater than 50% of this total time spent on direct patient counseling and coordination of care      Chief Complaint:   Flank pain and fevers    History of Present Illness:    Rambo Bradford is a 35 y o  female who presents with flank pain and fevers  This is an extremely pleasant 59-year-old female patient with a history of nephrolithiasis who underwent a stent about 2 weeks ago by [facility] urology  The patient was supposed to have stage procedure done which is scheduled for October but she came in because on Sunday she started feeling a little feverish and had some right-sided flank pain  Monday to flank pain got worse and she started having a little bit more fever and did have a urinalysis done as an outpatient  She did call the urology office and was told to come in for fevers were to get worse  She stated that the fevers today were 103 at home around noon and the patient also even after her fever breaks with Tylenol or Motrin continues to feel feverish with some chills  Patient's pain got significantly worse today and was 8/10 in intensity and nothing was helping with the pain and is constant  Patient called urology is office and was directed to come here    Review of Systems:    Review of Systems   Constitutional: Positive for appetite change, chills and fever  Genitourinary: Positive for flank pain  Neurological: Positive for headaches  All other systems reviewed and are negative        Past Medical and Surgical History:     Past Medical History:   Diagnosis Date    Anxiety     Asthma     Chronic kidney disease     stones    Migraine     Mood disorder ([facility] [address] [facility]     Panic anxiety syndrome     Suicide attempt ([facility] [address] )     Varicella     childhood       Past Surgical History:   Procedure Laterality Date    DENTAL SURGERY N/A 2011    [facility] RETROGRADE PYELOGRAM  8/31/2020    [place] CYSTOURETHROSCOPY,URETER CATHETER Right 8/31/2020    Procedure: CYSTOSCOPY RETROGRADE PYELOGRAM WITH INSERTION STENT URETERAL;  Surgeon: Leigh Alicea MD;  [facility];  Service: Urology    SHOULDER SURGERY         Meds/Allergies:    Prior to Admission medications Not on File     I have reviewed home medications with patient personally  Allergies:    Allergies   Allergen Reactions    Pollen Extract        Social History:     Marital Status: Single     Patient Pre-hospital Living Situation:  Independent  Patient Pre-hospital Level of Mobility:  Independent  Patient Pre-hospital Diet Restrictions:  None  Substance Use History:   Social History     Substance and Sexual Activity   Alcohol Use Yes    Comment: On occasion     Social History     Tobacco Use   Smoking Status Current Every Day Smoker    Packs/day: 0 50    Years: 9 00    Pack years: 4 50    Types: Cigarettes    Last attempt to quit: 4/20/2017    Years since quitting: 3 4   Smokeless Tobacco Never Used     Social History     Substance and Sexual Activity   Drug Use No       Family History:    Family History   Problem Relation Age of Onset    Alcohol abuse Father     Kidney disease Father     Asthma Maternal Grandmother     Cancer Maternal Grandmother     COPD Maternal Grandmother     Cancer Maternal Grandfather     Diabetes Paternal Grandmother     Alcohol abuse Paternal Grandfather     Cancer Paternal Grandfather        Physical Exam:     Vitals:   Blood Pressure: 124/74 (09/15/20 1625)  Pulse: (!) 119 (09/15/20 1625)  Temperature: (!) 100 8 °F (38 2 °C) (09/15/20 1416)  Temp Source: Oral (09/15/20 1416)  Respirations: 16 (09/15/20 1625)  Height: 5' 4" (162 6 cm) (09/15/20 1412)  Weight - Scale: 69 9 kg (154 lb 1 6 oz) (09/15/20 1412)  SpO2: 99 % (09/15/20 1625)    Physical Exam    (   General Appearance:    Alert, cooperative, no distress, appears stated age   Head:    Normocephalic, without obvious abnormality, atraumatic   Eyes:    PERRL, conjunctiva/corneas clear, EOM's intact,             Nose:   Nares normal, septum midline, mucosa normal   Throat:   Lips, mucosa, and tongue normal; teeth and gums normal   Neck:   Supple, symmetrical, no adenopathy;        thyroid:  No enlargement/tenderness/nodules; no carotid    bruit or JVD   Back:     Right flank CVA tenderness   Lungs:     Clear to auscultation bilaterally, respirations unlabored       Heart:    Regular rate and rhythm, S1 and S2 normal, no murmur, rub    or gallop   Abdomen:     Soft, non-tender, bowel sounds active all four quadrants,     no masses, no organomegaly           Extremities:   Extremities normal, atraumatic, no cyanosis or edema   Pulses:   2+ and symmetric all extremities   Skin:   Skin color, texture, turgor normal, no rashes or lesions   Lymph nodes:   Cervical, supraclavicular, and axillary nodes normal   Neurologic:   CNII-XII intact  Normal strength, sensation and reflexes       throughout      Additional Data:     Lab Results: I have personally reviewed pertinent reports  Results from last 7 days   Lab Units 09/15/20  1511   WBC Thousand/uL 14 02*   HEMOGLOBIN g/dL 14 6   HEMATOCRIT % 42 9   PLATELETS Thousands/uL 203   NEUTROS PCT % 90*   LYMPHS PCT % 3*   MONOS PCT % 7   EOS PCT % 0     Results from last 7 days   Lab Units 09/15/20  1512   SODIUM mmol/L 136   POTASSIUM mmol/L 3 6   CHLORIDE mmol/L 101   CO2 mmol/L 24   BUN mg/dL 8   CREATININE mg/dL 0 91   ANION GAP mmol/L 11   CALCIUM mg/dL 8 7   ALBUMIN g/dL 3 5   TOTAL BILIRUBIN mg/dL 0 74   ALK PHOS U/L 74   ALT U/L 15   AST U/L 12   GLUCOSE RANDOM mg/dL 141*                 Results from last 7 days   Lab Units 09/15/20  1512   LACTIC ACID mmol/L 1 1       Imaging: I have personally reviewed pertinent reports  CT abdomen pelvis with contrast   Final Result by Ivania Aragon MD (09/15 1741)      Right pyelonephritis  The study was marked in [facility] for immediate notification  Workstation performed: [id number]             EKG, Pathology, and Other Studies Reviewed on Admission:   · CT scan reviewed    Allscripts / Epic Records Reviewed: Yes     ** Please Note: This note has been constructed using a voice recognition system  **

## 2020-09-15 NOTE — ED PROVIDER NOTES
History  Chief Complaint   Patient presents with    Fever - 9 weeks to 74 years     103 fevers from stent placement on right side for kidney stone 2 weeks ago  pain with urination and lower back pains  sent by urology to ER     Kenyon Bland is a 35 y o  female with PMHx of nephrolithiasis  Who presents today for a fever of 103 0 with associated night sweats, chills and flank pain  Recently on 08/31/2020 patient underwent cystoscopy with ureteral stent placement, and urology recommended a stage procedure given possible pyelonephritis, she was was subsequently discharged on 9 additional days of Omnicef b i d  The patient reports her current episode of fever started on Sunday with a temperature of 100 0 for which the patient took Advil  The patient reports that she is contacted Urology who told her that she not need to come to the ER unless her temperature was elevated above 103, but yesterday they sent the patient for a urine culture, results are still pending    Today at lunch the patient's temperature was 103 0°, for which she took Tylenol at approximately 12:15 p m  Imaging from 08/31/2020 showed moderate right hydronephrosis related to a 6 x 5 x 4 mm calculus within the proximal right ureter, just beyond the right ureteropelvic junction, in addition to findings suggesting urinary tract infection/pyelonephritis            None       Past Medical History:   Diagnosis Date    Anxiety     Asthma     Chronic kidney disease     stones    Migraine     Mood disorder (HCC)     Panic anxiety syndrome     Suicide attempt ([address] )     Varicella     childhood       Past Surgical History:   Procedure Laterality Date   [facility] DENTAL SURGERY N/A 2011    [place] RETROGRADE PYELOGRAM  8/31/2020    [place] CYSTOURETHROSCOPY,URETER CATHETER Right 8/31/2020    Procedure: CYSTOSCOPY RETROGRADE PYELOGRAM WITH INSERTION STENT URETERAL;  Surgeon: Batsheva Caldwell MD;  [facility];  Service: Urology    SHOULDER SURGERY Family History   Problem Relation Age of Onset    Alcohol abuse Father     Kidney disease Father     Asthma Maternal Grandmother     Cancer Maternal Grandmother     COPD Maternal Grandmother     Cancer Maternal Grandfather     Diabetes Paternal Grandmother     Alcohol abuse Paternal Grandfather     Cancer Paternal Grandfather      I have reviewed and agree with the history as documented  E-Cigarette/Vaping    E-Cigarette Use Never User      E-Cigarette/Vaping Substances     Social History     Tobacco Use    Smoking status: Current Every Day Smoker     Packs/day: 0 50     Years: 9 00     Pack years: 4 50     Types: Cigarettes     Last attempt to quit: 4/20/2017     Years since quitting: 3 4    Smokeless tobacco: Never Used   Substance Use Topics    Alcohol use: Yes     Comment: On occasion    Drug use: No        Review of Systems   Constitutional: Positive for chills, fatigue and fever  HENT: Negative for congestion  Respiratory: Negative for shortness of breath  Cardiovascular: Negative for chest pain  Gastrointestinal: Negative for constipation, diarrhea, nausea and vomiting  Genitourinary: Positive for dysuria and flank pain  Negative for frequency, hematuria, pelvic pain and urgency  Neurological: Negative for dizziness, weakness, numbness and headaches  All other systems reviewed and are negative        Physical Exam  ED Triage Vitals   Temperature Pulse Respirations Blood Pressure SpO2   09/15/20 1416 09/15/20 1412 09/15/20 1412 09/15/20 1412 09/15/20 1412   (!) 100 8 °F (38 2 °C) (!) 125 18 121/78 97 %      Temp Source Heart Rate Source Patient Position - Orthostatic VS BP Location FiO2 (%)   09/15/20 1414 09/15/20 1412 09/15/20 1412 09/15/20 1412 --   Oral Monitor Sitting Right arm       Pain Score       09/15/20 1412       8             Orthostatic Vital Signs  Vitals:    09/15/20 1527 09/15/20 1625 09/15/20 1925 09/15/20 1935   BP: 116/69 124/74 102/57 103/57   Pulse: (!) 113 (!) 119 (!) 111 (!) 111   Patient Position - Orthostatic VS: Sitting Sitting Lying Lying       Physical Exam  Vitals signs and nursing note reviewed  Constitutional:       General: She is not in acute distress  Appearance: She is well-developed  She is not diaphoretic  HENT:      Head: Normocephalic and atraumatic  Nose: Nose normal    Eyes:      General: No scleral icterus  Right eye: No discharge  Left eye: No discharge  Conjunctiva/sclera: Conjunctivae normal    Cardiovascular:      Rate and Rhythm: Normal rate and regular rhythm  Heart sounds: Normal heart sounds  No murmur  No friction rub  No gallop  Pulmonary:      Effort: Pulmonary effort is normal  No respiratory distress  Breath sounds: Normal breath sounds  No wheezing  Chest:      Chest wall: No tenderness  Abdominal:      General: There is no distension  Tenderness: There is no abdominal tenderness  There is right CVA tenderness  There is no left CVA tenderness  Skin:     General: Skin is warm and dry  Neurological:      General: No focal deficit present  Mental Status: She is alert and oriented to person, place, and time           ED Medications  Medications   sodium chloride 0 9 % bolus 1,000 mL (0 mL Intravenous Stopped 9/15/20 1802)   ibuprofen (MOTRIN) tablet 400 mg (400 mg Oral Given 9/15/20 1432)   ketorolac (TORADOL) injection 15 mg (15 mg Intravenous Given 9/15/20 1615)   iohexol (OMNIPAQUE) 350 MG/ML injection (MULTI-DOSE) 100 mL (100 mL Intravenous Given 9/15/20 1602)   ceftriaxone (ROCEPHIN) 1 g/50 mL in dextrose IVPB (0 mg Intravenous Stopped 9/15/20 1802)   ketorolac (TORADOL) injection 15 mg (15 mg Intravenous Given 9/15/20 1812)   ondansetron (ZOFRAN) injection 4 mg (4 mg Intravenous Given 9/15/20 1812)   HYDROmorphone (DILAUDID) injection 0 5 mg (0 5 mg Intravenous Given 9/15/20 1931)       Diagnostic Studies  Results Reviewed     Procedure Component Value Units Date/Time    Urine Microscopic [[id number]]  (Abnormal) Collected:  09/15/20 1512    Lab Status:  Final result Specimen:  Urine, Clean Catch Updated:  09/15/20 1609     RBC, UA 1-2 /hpf      WBC, UA 4-10 /hpf      Epithelial Cells Occasional /hpf      Bacteria, UA Occasional /hpf     CBC and differential [[id number]]  (Abnormal) Collected:  09/15/20 1511    Lab Status:  Final result Specimen:  Blood from Arm, Right Updated:  09/15/20 1600     WBC 14 02 Thousand/uL      RBC 4 52 Million/uL      Hemoglobin 14 6 g/dL      Hematocrit 42 9 %      MCV 95 fL      MCH 32 3 pg      MCHC 34 0 g/dL      RDW 11 5 %      MPV 10 1 fL      Platelets 261 Thousands/uL      nRBC 0 /100 WBCs      Neutrophils Relative 90 %      Immat GRANS % 0 %      Lymphocytes Relative 3 %      Monocytes Relative 7 %      Eosinophils Relative 0 %      Basophils Relative 0 %      Neutrophils Absolute 12 60 Thousands/µL      Immature Grans Absolute 0 05 Thousand/uL      Lymphocytes Absolute 0 41 Thousands/µL      Monocytes Absolute 0 93 Thousand/µL      Eosinophils Absolute 0 00 Thousand/µL      Basophils Absolute 0 03 Thousands/µL     Narrative: This is an appended report  These results have been appended to a previously verified report  Lactic acid [[id number]]  (Normal) Collected:  09/15/20 1512    Lab Status:  Final result Specimen:  Blood from Arm, Right Updated:  09/15/20 1539     LACTIC ACID 1 1 mmol/L     Narrative:       Result may be elevated if tourniquet was used during collection      UA w Reflex to Microscopic w Reflex to Culture [[id number]]  (Abnormal) Collected:  09/15/20 1512    Lab Status:  Final result Specimen:  Urine, Clean Catch Updated:  09/15/20 1538     Color, UA Yellow     Clarity, UA Clear     Specific Gravity, UA <=1 005     pH, UA 6 0     Leukocytes, UA Small     Nitrite, UA Negative     Protein, UA Negative mg/dl      Glucose, UA Negative mg/dl      Ketones, UA 40 (2+) mg/dl      Urobilinogen, UA 0 2 E U /dl Bilirubin, UA Negative     Blood, UA Moderate    Comprehensive metabolic panel [[id number]]  (Abnormal) Collected:  09/15/20 1512    Lab Status:  Final result Specimen:  Blood from Arm, Right Updated:  09/15/20 1536     Sodium 136 mmol/L      Potassium 3 6 mmol/L      Chloride 101 mmol/L      CO2 24 mmol/L      ANION GAP 11 mmol/L      BUN 8 mg/dL      Creatinine 0 91 mg/dL      Glucose 141 mg/dL      Calcium 8 7 mg/dL      AST 12 U/L      ALT 15 U/L      Alkaline Phosphatase 74 U/L      Total Protein 7 2 g/dL      Albumin 3 5 g/dL      Total Bilirubin 0 74 mg/dL      eGFR 86 ml/min/1 73sq m     Narrative:       [place] guidelines for Chronic Kidney Disease (CKD):     Stage 1 with normal or high GFR (GFR > 90 mL/min/1 73 square meters)    Stage 2 Mild CKD (GFR = 60-89 mL/min/1 73 square meters)    Stage 3A Moderate CKD (GFR = 45-59 mL/min/1 73 square meters)    Stage 3B Moderate CKD (GFR = 30-44 mL/min/1 73 square meters)    Stage 4 Severe CKD (GFR = 15-29 mL/min/1 73 square meters)    Stage 5 End Stage CKD (GFR <15 mL/min/1 73 square meters)  Note: GFR calculation is accurate only with a steady state creatinine    Blood culture #1 [[id number]] Collected:  09/15/20 1527    Lab Status: In process Specimen:  Blood from Arm, Left Updated:  09/15/20 1531    POCT pregnancy, urine [[id number]]  (Normal) Resulted:  09/15/20 1522    Lab Status:  Final result Updated:  09/15/20 1522     EXT PREG TEST UR (Ref: Negative) Negative     Control Valid    Blood culture #2 [[id number]] Collected:  09/15/20 1512    Lab Status: In process Specimen:  Blood from Arm, Right Updated:  09/15/20 1517                 CT abdomen pelvis with contrast   Final Result by Ryan Montilla MD (09/15 1741)      Right pyelonephritis  The study was marked in [facility] for immediate notification        Workstation performed: [id number]               Procedures  Procedures      ED Course  ED Course as of Sep 15 1950 Tue Sep 15, 2020   1415 Patient seen examined by me  Patient is currently febrile and tachycardic we will obtain infectious workup  3435 [facility] Patient's lab work was notable for elevated glucose, increased leukocytosis 14 02, UA mildly positive for UTI will wait for microscopic  Heart rate elevated above 90  Temperature 100 8  Patient currently meets SIRS criteria, if UA shows bacteria patient would meet sepsis criteria  36 Pt started on 1g rocephin      1745 CT showed "Right pyelonephritis"  1755 Patient will be admitted to [facility]  MDM  Number of Diagnoses or Management Options  Diagnosis management comments: 71-year-old female with a history of nephrolithiasis presenting today with a fever in the setting of a known kidney stone on the right  Patient is slightly tachycardic on arrival to the emergency room we will obtain infectious lab work along with basic labs and UA  We will contact Urology based on lab findings to determine they would like additional imaging and how we should progress  Amount and/or Complexity of Data Reviewed  Clinical lab tests: ordered and reviewed  Tests in the radiology section of CPT®: reviewed and ordered  Independent visualization of images, tracings, or specimens: yes          Disposition  Final diagnoses:   Nephrolithiasis   Pyelonephritis     Time reflects when diagnosis was documented in both MDM as applicable and the Disposition within this note     Time User Action Codes Description Comment    9/15/2020  6:54 PM Lazaro Valdez Add [N20 0] Nephrolithiasis     9/15/2020  6:54 PM Vanessa VILLANUEVA Modify [N20 0] Nephrolithiasis     9/15/2020  6:54 PM Jessenia Bryant Add [N12] Pyelonephritis       ED Disposition     ED Disposition Condition Date/Time Comment    Admit Stable Tue Sep 15, 2020  6:12 PM Case was discussed with JAZMINE and the patient's admission status was agreed to be Admission Status: inpatient status to the service of Dr Pérez Hylton  Follow-up Information    None         Patient's Medications    No medications on file     No discharge procedures on file  PDMP Review       Value Time User    PDMP Reviewed  Yes 8/31/2020  1:14 AM Heath Simms PA-C           ED Provider  Attending physically available and evaluated Rima Merchant I managed the patient along with the ED Attending      Electronically Signed by         Laurent Horne DO  09/15/20 Hang

## 2020-09-16 PROBLEM — A41.9 SEPSIS (HCC): Status: ACTIVE | Noted: 2020-09-16

## 2020-09-16 LAB
ANION GAP SERPL CALCULATED.3IONS-SCNC: 11 MMOL/L (ref 4–13)
BACTERIA UR CULT: ABNORMAL
BACTERIA UR CULT: ABNORMAL
BUN SERPL-MCNC: 4 MG/DL (ref 5–25)
CALCIUM SERPL-MCNC: 7.6 MG/DL (ref 8.3–10.1)
CHLORIDE SERPL-SCNC: 105 MMOL/L (ref 100–108)
CO2 SERPL-SCNC: 20 MMOL/L (ref 21–32)
CREAT SERPL-MCNC: 0.67 MG/DL (ref 0.6–1.3)
ERYTHROCYTE [DISTWIDTH] IN BLOOD BY AUTOMATED COUNT: 11.8 % (ref 11.6–15.1)
GFR SERPL CREATININE-BSD FRML MDRD: 119 ML/MIN/1.73SQ M
GLUCOSE SERPL-MCNC: 106 MG/DL (ref 65–140)
HCT VFR BLD AUTO: 37.3 % (ref 34.8–46.1)
HGB BLD-MCNC: 12.4 G/DL (ref 11.5–15.4)
MCH RBC QN AUTO: 31.3 PG (ref 26.8–34.3)
MCHC RBC AUTO-ENTMCNC: 33.2 G/DL (ref 31.4–37.4)
MCV RBC AUTO: 94 FL (ref 82–98)
PLATELET # BLD AUTO: 173 THOUSANDS/UL (ref 149–390)
PMV BLD AUTO: 10.3 FL (ref 8.9–12.7)
POTASSIUM SERPL-SCNC: 3.4 MMOL/L (ref 3.5–5.3)
RBC # BLD AUTO: 3.96 MILLION/UL (ref 3.81–5.12)
SODIUM SERPL-SCNC: 136 MMOL/L (ref 136–145)
WBC # BLD AUTO: 13.66 THOUSAND/UL (ref 4.31–10.16)

## 2020-09-16 PROCEDURE — 99254 IP/OBS CNSLTJ NEW/EST MOD 60: CPT | Performed by: PHYSICIAN ASSISTANT

## 2020-09-16 PROCEDURE — 80048 BASIC METABOLIC PNL TOTAL CA: CPT | Performed by: FAMILY MEDICINE

## 2020-09-16 PROCEDURE — 85027 COMPLETE CBC AUTOMATED: CPT | Performed by: FAMILY MEDICINE

## 2020-09-16 PROCEDURE — 99232 SBSQ HOSP IP/OBS MODERATE 35: CPT | Performed by: PHYSICIAN ASSISTANT

## 2020-09-16 RX ORDER — LANOLIN ALCOHOL/MO/W.PET/CERES
3 CREAM (GRAM) TOPICAL
Status: DISCONTINUED | OUTPATIENT
Start: 2020-09-16 | End: 2020-09-17 | Stop reason: HOSPADM

## 2020-09-16 RX ORDER — DIPHENHYDRAMINE HCL 25 MG
25 TABLET ORAL
Status: DISCONTINUED | OUTPATIENT
Start: 2020-09-16 | End: 2020-09-17 | Stop reason: HOSPADM

## 2020-09-16 RX ADMIN — KETOROLAC TROMETHAMINE 15 MG: 30 INJECTION, SOLUTION INTRAMUSCULAR at 02:07

## 2020-09-16 RX ADMIN — SODIUM CHLORIDE 125 ML/HR: 0.9 INJECTION, SOLUTION INTRAVENOUS at 21:39

## 2020-09-16 RX ADMIN — MORPHINE SULFATE 2 MG: 2 INJECTION, SOLUTION INTRAMUSCULAR; INTRAVENOUS at 17:22

## 2020-09-16 RX ADMIN — SODIUM CHLORIDE 125 ML/HR: 0.9 INJECTION, SOLUTION INTRAVENOUS at 05:24

## 2020-09-16 RX ADMIN — KETOROLAC TROMETHAMINE 15 MG: 30 INJECTION, SOLUTION INTRAMUSCULAR at 09:24

## 2020-09-16 RX ADMIN — MORPHINE SULFATE 2 MG: 2 INJECTION, SOLUTION INTRAMUSCULAR; INTRAVENOUS at 13:22

## 2020-09-16 RX ADMIN — ACETAMINOPHEN 650 MG: 325 TABLET, FILM COATED ORAL at 07:55

## 2020-09-16 RX ADMIN — MORPHINE SULFATE 2 MG: 2 INJECTION, SOLUTION INTRAMUSCULAR; INTRAVENOUS at 06:03

## 2020-09-16 RX ADMIN — ACETAMINOPHEN 650 MG: 325 TABLET, FILM COATED ORAL at 15:59

## 2020-09-16 RX ADMIN — SODIUM CHLORIDE 125 ML/HR: 0.9 INJECTION, SOLUTION INTRAVENOUS at 13:22

## 2020-09-16 RX ADMIN — MELATONIN 3 MG: at 21:37

## 2020-09-16 RX ADMIN — ONDANSETRON 4 MG: 2 INJECTION INTRAMUSCULAR; INTRAVENOUS at 03:50

## 2020-09-16 RX ADMIN — KETOROLAC TROMETHAMINE 15 MG: 30 INJECTION, SOLUTION INTRAMUSCULAR at 16:00

## 2020-09-16 RX ADMIN — ACETAMINOPHEN 650 MG: 325 TABLET, FILM COATED ORAL at 21:37

## 2020-09-16 RX ADMIN — CEFTRIAXONE SODIUM 1000 MG: 10 INJECTION, POWDER, FOR SOLUTION INTRAVENOUS at 15:59

## 2020-09-16 RX ADMIN — ONDANSETRON 4 MG: 2 INJECTION INTRAMUSCULAR; INTRAVENOUS at 14:19

## 2020-09-16 NOTE — PROGRESS NOTES
Progress Note - Yolanda Mayfield 1990, 35 y o  female MRN: [id number]    Unit/Bed#: W -01 Encounter: [id number]    Primary Care Provider: No primary care provider on file  Date and time admitted to hospital: 9/15/2020  2:10 PM    * Sepsis [facility])  Assessment & Plan  · Patient had sepsis criteria present on admission including high-grade fever, tachycardia, and leukocytosis  Patient persisting in having high-grade fever of 103 this morning, but clinically looks quite well currently  May need to consider changing to an alternative antibiotic like a quinolone if persistent sepsis criteria  · Suspected source is felt to be right-sided pyelonephritis  · Patient is on day 2 of IV ceftriaxone  Await final urine cultures  Currently growing staph and GNR   · Awaiting final blood cultures  · Noted that she recently had a course of PO cefdinir    Pyelonephritis  Assessment & Plan  · Patient presented with sepsis criteria and right-sided pain; noted that patient recently had instrumentation/ right-sided stent placed  · CT scan abdomen pelvis consistent with right-sided pyelonephritis  · Continue supportive care with IV fluids, antiemetics, pain medication    Nephrolithiasis  Assessment & Plan  · Patient had a stent placed 2 weeks ago  In October she is scheduled for follow-up definitive management  Appreciate urology input  Patient does not require any additional urologic intervention immediately    Headache  Assessment & Plan  · Patient has history of migraines  Headache present on admission was likely exacerbated by dehydration and sepsis/pyelonephritis  Patient now reports improvement    VTE Pharmacologic Prophylaxis:   Pharmacologic:  Suggested addition of Lovenox based on VTE score of 3 but patient is quite reluctant for needles    She is agreeable to ambulate frequently and use SCDs  Mechanical VTE Prophylaxis in Place:     Patient Centered Rounds: I have performed bedside rounds with nursing staff today     Discussions with Specialists or Other Care Team Provider: case mgmt    Education and Discussions with Family / Patient:  Boyfriend at bedside    Time Spent for Care: 25 min  More than 50% of total time spent on counseling and coordination of care as described above  Current Length of Stay: 1 day(s)    Current Patient Status: Inpatient   Certification Statement: The patient will continue to require additional inpatient hospital stay due to sepsis, ongoing IV antbx pending cultures    Discharge Plan: hopefully home ?tomorrow    Code Status: Level 1 - Full Code    Subjective:   Patient reports her headache is improved  She has not had any additional events of nausea and vomiting since last night  She does admit there was a slight bit of blood tinge in the vomit last night  She has not had any blood in her urine  She is continuing to have some right-sided flank pain but overall is feeling much better at this time  She is tolerating regular diet  She reports she did not sleep well at all last night  Objective:     Vitals:   Temp (24hrs), Av 2 °F (38 4 °C), Min:99 6 °F (37 6 °C), Max:103 1 °F (39 5 °C)    Temp:  [99 6 °F (37 6 °C)-103 1 °F (39 5 °C)] 100 3 °F (37 9 °C)  HR:  [103-132] 103  Resp:  [16-20] 18  BP: (102-124)/(57-78) 110/61  SpO2:  [96 %-100 %] 97 %  Body mass index is 26 45 kg/m²  Input and Output Summary (last 24 hours): Intake/Output Summary (Last 24 hours) at 2020 1317  Last data filed at 2020 0601  Gross per 24 hour   Intake 2197 92 ml   Output --   Net 2197 92 ml       Physical Exam:     Physical Exam  Vitals signs reviewed  Constitutional:       General: She is not in acute distress  Appearance: Normal appearance  She is normal weight  She is not ill-appearing, toxic-appearing or diaphoretic  HENT:      Head: Normocephalic and atraumatic  Nose: No congestion or rhinorrhea  Eyes:      General: No scleral icterus          Right eye: No discharge  Left eye: No discharge  Conjunctiva/sclera: Conjunctivae normal    Neck:      Musculoskeletal: Neck supple  Cardiovascular:      Rate and Rhythm: Normal rate and regular rhythm  Heart sounds: No murmur  Pulmonary:      Effort: Pulmonary effort is normal  No respiratory distress  Breath sounds: Normal breath sounds  No stridor  No wheezing, rhonchi or rales  Abdominal:      General: Bowel sounds are normal  There is no distension  Palpations: Abdomen is soft  Tenderness: There is no abdominal tenderness  There is no guarding  Musculoskeletal:      Right lower leg: No edema  Left lower leg: No edema  Skin:     General: Skin is warm and dry  Coloration: Skin is not jaundiced or pale  Findings: No bruising, erythema or lesion  Neurological:      General: No focal deficit present  Mental Status: She is alert  Comments: Awake alert interactive, good historian   Psychiatric:         Mood and Affect: Mood normal          Thought Content: Thought content normal          Additional Data:     Labs:    Results from last 7 days   Lab Units 09/16/20  0533 09/15/20  1511   WBC Thousand/uL 13 66* 14 02*   HEMOGLOBIN g/dL 12 4 14 6   HEMATOCRIT % 37 3 42 9   PLATELETS Thousands/uL 173 203   NEUTROS PCT %  --  90*   LYMPHS PCT %  --  3*   MONOS PCT %  --  7   EOS PCT %  --  0     Results from last 7 days   Lab Units 09/16/20  0533 09/15/20  1512   SODIUM mmol/L 136 136   POTASSIUM mmol/L 3 4* 3 6   CHLORIDE mmol/L 105 101   CO2 mmol/L 20* 24   BUN mg/dL 4* 8   CREATININE mg/dL 0 67 0 91   ANION GAP mmol/L 11 11   CALCIUM mg/dL 7 6* 8 7   ALBUMIN g/dL  --  3 5   TOTAL BILIRUBIN mg/dL  --  0 74   ALK PHOS U/L  --  74   ALT U/L  --  15   AST U/L  --  12   GLUCOSE RANDOM mg/dL 106 141*                 Results from last 7 days   Lab Units 09/15/20  1512   LACTIC ACID mmol/L 1 1     * I Have Reviewed All Lab Data Listed Above    * Additional Pertinent Lab Tests Reviewed: Anam 66 Admission Reviewed    Imaging:    Imaging Reports Reviewed Today Include: ct scan  Imaging Personally Reviewed by Myself Includes:      Recent Cultures (last 7 days):     Results from last 7 days   Lab Units 09/15/20  1527 09/15/20  1512 09/14/20  1228   BLOOD CULTURE  Received in Microbiology Lab  Culture in Progress  Received in Microbiology Lab  Culture in Progress  --    URINE CULTURE   --   --  70,000-79,000 cfu/ml Staphylococcus coagulase negative*  50,000-59,000 cfu/ml Gram Negative Riki Enteric Like*       Last 24 Hours Medication List:   Current Facility-Administered Medications   Medication Dose Route Frequency Provider Last Rate    acetaminophen  650 mg Oral Q6H PRN Minnie Strickland MD      cefTRIAXone  1,000 mg Intravenous Q24H Minnie Strickland MD      ketorolac  15 mg Intravenous Q6H PRN Minnie Strickland MD      morphine injection  2 mg Intravenous Q6H PRN Minine Strickland MD      ondansetron  4 mg Intravenous Q6H PRN Minnie Strickland MD      sodium chloride  125 mL/hr Intravenous Continuous Minnie Strickland  mL/hr (09/16/20 0524)        Today, Patient Was Seen By: Lucho Paulson PA-C    ** Please Note: Dictation voice to text software may have been used in the creation of this document   **

## 2020-09-16 NOTE — ASSESSMENT & PLAN NOTE
Called pt and left message to call back. She has an order in her chart for an U/S of the Thyroid. Asked to call back to give me info as to where she wants to go have it done and I can give her phone numbers. · Patient has history of migraines  Headache present on admission was likely exacerbated by dehydration and sepsis/pyelonephritis    Patient now reports improvement

## 2020-09-16 NOTE — ASSESSMENT & PLAN NOTE
· Patient presented with sepsis criteria and right-sided pain; noted that patient recently had instrumentation/ right-sided stent placed  · CT scan abdomen pelvis consistent with right-sided pyelonephritis  · Continue supportive care with IV fluids, antiemetics, pain medication

## 2020-09-16 NOTE — ASSESSMENT & PLAN NOTE
· Patient had a stent placed 2 weeks ago  In October she is scheduled for follow-up definitive management  Appreciate urology input    Patient does not require any additional urologic intervention immediately

## 2020-09-16 NOTE — ASSESSMENT & PLAN NOTE
· Patient had sepsis criteria present on admission including high-grade fever, tachycardia, and leukocytosis  Patient persisting in having high-grade fever of 103 this morning, but clinically looks quite well currently  May need to consider changing to an alternative antibiotic like a quinolone if persistent sepsis criteria  · Suspected source is felt to be right-sided pyelonephritis  · Patient is on day 2 of IV ceftriaxone  Await final urine cultures    Currently growing staph and GNR   · Awaiting final blood cultures  · Noted that she recently had a course of PO cefdinir

## 2020-09-16 NOTE — PLAN OF CARE
Problem: PAIN - ADULT  Goal: Verbalizes/displays adequate comfort level or baseline comfort level  Description: Interventions:  - Encourage patient to monitor pain and request assistance  - Assess pain using appropriate pain scale  - Administer analgesics based on type and severity of pain and evaluate response  - Implement non-pharmacological measures as appropriate and evaluate response  - Consider cultural and social influences on pain and pain management  - Notify physician/advanced practitioner if interventions unsuccessful or patient reports new pain  Outcome: Progressing     Problem: INFECTION - ADULT  Goal: Absence or prevention of progression during hospitalization  Description: INTERVENTIONS:  - Assess and monitor for signs and symptoms of infection  - Monitor lab/diagnostic results  - Monitor all insertion sites, i e  indwelling lines, tubes, and drains  - Monitor endotracheal if appropriate and nasal secretions for changes in amount and color  - [place] appropriate cooling/warming therapies per order  - Administer medications as ordered  - Instruct and encourage patient and family to use good hand hygiene technique  - Identify and instruct in appropriate isolation precautions for identified infection/condition  Outcome: Progressing     Problem: SAFETY ADULT  Goal: Patient will remain free of falls  Description: INTERVENTIONS:  - Assess patient frequently for physical needs  -  Identify cognitive and physical deficits and behaviors that affect risk of falls    -  [place] fall precautions as indicated by assessment   - Educate patient/family on patient safety including physical limitations  - Instruct patient to call for assistance with activity based on assessment  - Modify environment to reduce risk of injury  - Consider OT/PT consult to assist with strengthening/mobility  Outcome: Progressing  Goal: Maintain or return to baseline ADL function  Description: INTERVENTIONS:  -  Assess patient's ability to carry out ADLs; assess patient's baseline for ADL function and identify physical deficits which impact ability to perform ADLs (bathing, care of mouth/teeth, toileting, grooming, dressing, etc )  - Assess/evaluate cause of self-care deficits   - Assess range of motion  - Assess patient's mobility; develop plan if impaired  - Assess patient's need for assistive devices and provide as appropriate  - Encourage maximum independence but intervene and supervise when necessary  - Involve family in performance of ADLs  - Assess for home care needs following discharge   - Consider OT consult to assist with ADL evaluation and planning for discharge  - Provide patient education as appropriate  Outcome: Progressing  Goal: Maintain or return mobility status to optimal level  Description: INTERVENTIONS:  - Assess patient's baseline mobility status (ambulation, transfers, stairs, etc )    - Identify cognitive and physical deficits and behaviors that affect mobility  - Identify mobility aids required to assist with transfers and/or ambulation (gait belt, sit-to-stand, lift, walker, cane, etc )  - [place] fall precautions as indicated by assessment  - Record patient progress and toleration of activity level on Mobility SBAR; progress patient to next Phase/Stage  - Instruct patient to call for assistance with activity based on assessment  - Consider rehabilitation consult to assist with strengthening/weightbearing, etc   Outcome: Progressing     Problem: DISCHARGE PLANNING  Goal: Discharge to home or other facility with appropriate resources  Description: INTERVENTIONS:  - Identify barriers to discharge w/patient and caregiver  - Arrange for needed discharge resources and transportation as appropriate  - Identify discharge learning needs (meds, wound care, etc )  - Arrange for interpretive services to assist at discharge as needed  - Refer to Case Management Department for coordinating discharge planning if the patient needs post-hospital services based on physician/advanced practitioner order or complex needs related to functional status, cognitive ability, or social support system  Outcome: Progressing     Problem: Knowledge Deficit  Goal: Patient/family/caregiver demonstrates understanding of disease process, treatment plan, medications, and discharge instructions  Description: Complete learning assessment and assess knowledge base    Interventions:  - Provide teaching at level of understanding  - Provide teaching via preferred learning methods  Outcome: Progressing

## 2020-09-16 NOTE — PLAN OF CARE
Problem: PAIN - ADULT  Goal: Verbalizes/displays adequate comfort level or baseline comfort level  Description: Interventions:  - Encourage patient to monitor pain and request assistance  - Assess pain using appropriate pain scale  - Administer analgesics based on type and severity of pain and evaluate response  - Implement non-pharmacological measures as appropriate and evaluate response  - Consider cultural and social influences on pain and pain management  - Notify physician/advanced practitioner if interventions unsuccessful or patient reports new pain  Outcome: Progressing     Problem: INFECTION - ADULT  Goal: Absence or prevention of progression during hospitalization  Description: INTERVENTIONS:  - Assess and monitor for signs and symptoms of infection  - Monitor lab/diagnostic results  - Monitor all insertion sites, i e  indwelling lines, tubes, and drains  - Monitor endotracheal if appropriate and nasal secretions for changes in amount and color  - [place] appropriate cooling/warming therapies per order  - Administer medications as ordered  - Instruct and encourage patient and family to use good hand hygiene technique  - Identify and instruct in appropriate isolation precautions for identified infection/condition  Outcome: Progressing     Problem: SAFETY ADULT  Goal: Patient will remain free of falls  Description: INTERVENTIONS:  - Assess patient frequently for physical needs  -  Identify cognitive and physical deficits and behaviors that affect risk of falls    -  [place] fall precautions as indicated by assessment   - Educate patient/family on patient safety including physical limitations  - Instruct patient to call for assistance with activity based on assessment  - Modify environment to reduce risk of injury  - Consider OT/PT consult to assist with strengthening/mobility  Outcome: Progressing  Goal: Maintain or return to baseline ADL function  Description: INTERVENTIONS:  -  Assess patient's ability to carry out ADLs; assess patient's baseline for ADL function and identify physical deficits which impact ability to perform ADLs (bathing, care of mouth/teeth, toileting, grooming, dressing, etc )  - Assess/evaluate cause of self-care deficits   - Assess range of motion  - Assess patient's mobility; develop plan if impaired  - Assess patient's need for assistive devices and provide as appropriate  - Encourage maximum independence but intervene and supervise when necessary  - Involve family in performance of ADLs  - Assess for home care needs following discharge   - Consider OT consult to assist with ADL evaluation and planning for discharge  - Provide patient education as appropriate  Outcome: Progressing  Goal: Maintain or return mobility status to optimal level  Description: INTERVENTIONS:  - Assess patient's baseline mobility status (ambulation, transfers, stairs, etc )    - Identify cognitive and physical deficits and behaviors that affect mobility  - Identify mobility aids required to assist with transfers and/or ambulation (gait belt, sit-to-stand, lift, walker, cane, etc )  - [place] fall precautions as indicated by assessment  - Record patient progress and toleration of activity level on Mobility SBAR; progress patient to next Phase/Stage  - Instruct patient to call for assistance with activity based on assessment  - Consider rehabilitation consult to assist with strengthening/weightbearing, etc   Outcome: Progressing     Problem: DISCHARGE PLANNING  Goal: Discharge to home or other facility with appropriate resources  Description: INTERVENTIONS:  - Identify barriers to discharge w/patient and caregiver  - Arrange for needed discharge resources and transportation as appropriate  - Identify discharge learning needs (meds, wound care, etc )  - Arrange for interpretive services to assist at discharge as needed  - Refer to Case Management Department for coordinating discharge planning if the patient needs post-hospital services based on physician/advanced practitioner order or complex needs related to functional status, cognitive ability, or social support system  Outcome: Progressing     Problem: Knowledge Deficit  Goal: Patient/family/caregiver demonstrates understanding of disease process, treatment plan, medications, and discharge instructions  Description: Complete learning assessment and assess knowledge base    Interventions:  - Provide teaching at level of understanding  - Provide teaching via preferred learning methods  Outcome: Progressing

## 2020-09-16 NOTE — CONSULTS
[address] NOTE   Admission Date: 9/15/2020    Patient Identifiers: Meena Rogers (MRN: [id number])  Service Requesting Consultation: Eva White MD  Service Providing Consultation:  Urology, Trini Leonard PA-C  Consults  Date of Service: 9/16/2020    Reason for Consultation:  Pyelonephritis with indwelling right stent    History of Present Illness:     Meena Rogers is a 35 y o  female who presents with fever and flank pain  She had a stent placed for ureteral calculi about 2 weeks ago  She is scheduled for a ureteroscopy in early October for definitive treatment of her stone  She presented to the emergency room with fever and flank pain  It had been present for 2 days but got worse yesterday  Her T-max is 103  1  CT scan shows pyelonephritis with no other significant abnormalities  She still has pain but it has  subsided since she came into the hospital   She is hungry and asking for breakfast   Creatinine 0 67  WBC 13 66      Past Medical, Past Surgical History:     Past Medical History:   Diagnosis Date    Anxiety     Asthma     Chronic kidney disease     stones    Migraine     Mood disorder ([facility] [address] [facility]     Panic anxiety syndrome     Suicide attempt ([facility] [address] [facility]     Varicella     childhood   :    Past Surgical History:   Procedure Laterality Date    DENTAL SURGERY N/A 2011    FL RETROGRADE PYELOGRAM  8/31/2020    MI CYSTOURETHROSCOPY,URETER CATHETER Right 8/31/2020    Procedure: CYSTOSCOPY RETROGRADE PYELOGRAM WITH INSERTION STENT URETERAL;  Surgeon: Leigh Alicea MD;  [facility];  Service: Urology    SHOULDER SURGERY     :    Medications, Allergies:     Current Facility-Administered Medications:     acetaminophen (TYLENOL) tablet 650 mg, 650 mg, Oral, Q6H PRN, Silvia Perez MD, 650 mg at 09/16/20 0755    cefTRIAXone (ROCEPHIN) 1,000 mg in dextrose 5 % 50 mL IVPB, 1,000 mg, Intravenous, Q24H, Silvia Perez MD    ketorolac (TORADOL) injection 15 mg, 15 mg, Intravenous, Q6H PRN, Cori Marrufo MD, 15 mg at 09/16/20 0207    morphine injection 2 mg, 2 mg, Intravenous, Q6H PRN, Cori Marrufo MD, 2 mg at 09/16/20 0603    ondansetron (ZOFRAN) injection 4 mg, 4 mg, Intravenous, Q6H PRN, Cori Marrufo MD, 4 mg at 09/16/20 0350    sodium chloride 0 9 % infusion, 125 mL/hr, Intravenous, Continuous, Cori Marrufo MD, Last Rate: 125 mL/hr at 09/16/20 0524, 125 mL/hr at 09/16/20 0524    Allergies: Allergies   Allergen Reactions    Pollen Extract    :    Social and Family History:   Social History:   Social History     Tobacco Use    Smoking status: Current Every Day Smoker     Packs/day: 0 50     Years: 9 00     Pack years: 4 50     Types: Cigarettes     Last attempt to quit: 4/20/2017     Years since quitting: 3 4    Smokeless tobacco: Never Used   Substance Use Topics    Alcohol use: Yes     Comment: On occasion    Drug use: No        Social History     Tobacco Use   Smoking Status Current Every Day Smoker    Packs/day: 0 50    Years: 9 00    Pack years: 4 50    Types: Cigarettes    Last attempt to quit: 4/20/2017    Years since quitting: 3 4   Smokeless Tobacco Never Used       Family History:  Family History   Problem Relation Age of Onset    Alcohol abuse Father     Kidney disease Father     Asthma Maternal Grandmother     Cancer Maternal Grandmother     COPD Maternal Grandmother     Cancer Maternal Grandfather     Diabetes Paternal Grandmother     Alcohol abuse Paternal Grandfather     Cancer Paternal Grandfather    :     Review of Systems:     General: Fever, chills, or night sweats: positive  Cardiac: Negative for chest pain  Pulmonary: Negative for shortness of breath  Gastrointestinal: Abdominal pain positive  Nausea, vomiting, or diarrhea negative,  Genitourinary: See HPI above  Patient does not have hematuria  All other systems queried were negative  Physical Exam:   General: Patient is pleasant and in NAD   Awake and alert  BP 110/61 (BP Location: Right arm)   Pulse 103   Temp (!) 103 1 °F (39 5 °C) (Oral)   Resp 18   Ht 5' 4" (1 626 m)   Wt 69 9 kg (154 lb 1 6 oz)   LMP 09/05/2020 (Approximate)   SpO2 97%   BMI 26 45 kg/m²   HEENT:  No scleral icterus to conjunctivae are clear  Constitutional:  pleasant and cooperative     no apparent distress  Cardiac: Peripheral edema: negative  Pulmonary: Non-labored breathing  Abdomen: Soft, non-tender, non-distended  No surgical scars  No masses, tenderness, hernias noted  Genitourinary: Positive CVA tenderness, negative suprapubic tenderness  Extremities:  Moves all extremities without any deformity or weakness  Neurological:CNII-XII intact  No numbness or tingling  Essentially non focal neurologic exam  Psychiatric:mood affect and behavior normal    Labs:     Lab Results   Component Value Date    HGB 12 4 09/16/2020    HCT 37 3 09/16/2020    WBC 13 66 (H) 09/16/2020     09/16/2020   ]    Lab Results   Component Value Date     08/06/2015    K 3 4 (L) 09/16/2020     09/16/2020    CO2 20 (L) 09/16/2020    BUN 4 (L) 09/16/2020    CREATININE 0 67 09/16/2020    CALCIUM 7 6 (L) 09/16/2020    GLUCOSE 77 08/06/2015   ]    Imaging:   I personally reviewed the images and report of the following studies, and reviewed them with the patient:  CT ABDOMEN AND PELVIS WITH IV CONTRAST   IMPRESSION:     Right pyelonephritis  ASSESSMENT:     1  Acute pyelonephritis  2  Indwelling right nephroureteral stent  3  Nephrolithiasis     PLAN:   -I reviewed the findings with the patient  -there is no indication for urgent urologic intervention at this time  -she may have a regular diet  -continued management per the primary service with IV fluids pain control and antibiotics  -adjust antibiotics according to cultures when available  -please call with any further questions or concerns    Thank you for allowing me to participate in this patients care    Please do not hesitate to call with any additional questions    Keke Whitman PA-C

## 2020-09-16 NOTE — UTILIZATION REVIEW
Initial Clinical Review    Admission: Date/Time/Statement:   Admission Orders (From admission, onward)     Ordered        09/15/20 1813  Inpatient Admission  Once                   Orders Placed This Encounter   Procedures    Inpatient Admission     Standing Status:   Standing     Number of Occurrences:   1     Order Specific Question:   Admitting Physician     Answer:   Joel Horner     Order Specific Question:   Level of Care     Answer:   Med Surg [16]     Order Specific Question:   Estimated length of stay     Answer:   More than 2 Midnights     Order Specific Question:   Certification     Answer:   I certify that inpatient services are medically necessary for this patient for a duration of greater than two midnights  See H&P and MD Progress Notes for additional information about the patient's course of treatment  ED Arrival Information     Expected Arrival Acuity Means of Arrival Escorted By Service Admission Type    - 9/15/2020 13:57 [facility]    Arrival Complaint    Fever/Stent Placement (9/2/20)        Chief Complaint   Patient presents with    Fever - 9 weeks to 74 years     103 fevers from stent placement on right side for kidney stone 2 weeks ago  pain with urination and lower back pains  sent by urology to ER     Assessment/Plan:  Ms Booker Ramirez is a 34 yo female who presents to the ED from home at the direction of the Urology office with c/o flank pain and fevers  She had nephrolithiasis with stent placement 2 weeks ago and was scheduled for staged procedure in October  On 9/13 she developed fever with R side flank pain which worsened and her fever went to 103 0 with chills and worsening constant pain 8/10 with no relief, headaches and appetite change  PMH: Migraines  She is admitted to INPATIENT status with Pyelonephritis - IV antibiotics, blood and urine cultures    Nephrolithiasis - Urology consult, NPO       9/16 Urology Consult - Acute pyelonephritis, indwelling R nephroureteral stent, nephrolithiasis - no indication for surgery at this time, regular diet, IV fluids, analgesics and antibiotics        ED Triage Vitals   Temperature Pulse Respirations Blood Pressure SpO2   09/15/20 1416 09/15/20 1412 09/15/20 1412 09/15/20 1412 09/15/20 1412   (!) 100 8 °F (38 2 °C) (!) 125 18 121/78 97 %      Temp Source Heart Rate Source Patient Position - Orthostatic VS BP Location FiO2 (%)   09/15/20 1414 09/15/20 1412 09/15/20 1412 09/15/20 1412 --   Oral Monitor Sitting Right arm       Pain Score       09/15/20 1412       8          Wt Readings from Last 1 Encounters:   09/15/20 69 9 kg (154 lb 1 6 oz)     Additional Vital Signs:   09/16/20 0856   100 3 °F (37 9 °C)   --   --   --   --   --   --   --    09/16/20 0751   103 1 °F (39 5 °C)Abnormal     103   18   110/61   80   97 %   None (Room air)   --    09/16/20 00:04:27   99 6 °F (37 6 °C)   --   --   --   --   --   --   --    09/15/20 2140   103 1 °F (39 5 °C)Abnormal      123Abnormal     20   106/61   78   97 %   None (Room air)   Lying    Temp: Pt had on warm blanket prior at 09/15/20 2140    09/15/20 2000   100 4 °F (38 °C)   105   18   118/61   80   100 %   None (Room air)   Lying    09/15/20 1935   --   111Abnormal     18   103/57   --   98 %   None (Room air)   Lying    09/15/20 1925   --   111Abnormal     16   102/57   --   100 %   None (Room air)   Lying    09/15/20 1625   --   119Abnormal     16   124/74   --   99 %   None (Room air)   Sitting    09/15/20 1527   --   113Abnormal     18   116/69   --   100 %   None (Room air)   Sitting     Pertinent Labs/Diagnostic Test Results:     9/15 CT abd, pelvis - Right pyelonephritis      Results from last 7 days   Lab Units 09/16/20  0533 09/15/20  1511   WBC Thousand/uL 13 66* 14 02*   HEMOGLOBIN g/dL 12 4 14 6   HEMATOCRIT % 37 3 42 9   PLATELETS Thousands/uL 173 203   NEUTROS ABS Thousands/µL  --  12 60*     Results from last 7 days   Lab Units 09/16/20  0533 09/15/20  1512   SODIUM mmol/L 136 136   POTASSIUM mmol/L 3 4* 3 6   CHLORIDE mmol/L 105 101   CO2 mmol/L 20* 24   ANION GAP mmol/L 11 11   BUN mg/dL 4* 8   CREATININE mg/dL 0 67 0 91   EGFR ml/min/1 73sq m 119 86   CALCIUM mg/dL 7 6* 8 7     Results from last 7 days   Lab Units 09/15/20  1512   AST U/L 12   ALT U/L 15   ALK PHOS U/L 74   TOTAL PROTEIN g/dL 7 2   ALBUMIN g/dL 3 5   TOTAL BILIRUBIN mg/dL 0 74     Results from last 7 days   Lab Units 09/16/20  0533 09/15/20  1512   GLUCOSE RANDOM mg/dL 106 141*     Results from last 7 days   Lab Units 09/15/20  1512   LACTIC ACID mmol/L 1 1     Results from last 7 days   Lab Units 09/15/20  1512   CLARITY UA  Clear   COLOR UA  Yellow   SPEC GRAV UA  <=1 005   PH UA  6 0   GLUCOSE UA mg/dl Negative   KETONES UA mg/dl 40 (2+)*   BLOOD UA  Moderate*   PROTEIN UA mg/dl Negative   NITRITE UA  Negative   BILIRUBIN UA  Negative   UROBILINOGEN UA E U /dl 0 2   LEUKOCYTES UA  Small*   WBC UA /hpf 4-10*   RBC UA /hpf 1-2*   BACTERIA UA /hpf Occasional   EPITHELIAL CELLS WET PREP /hpf Occasional     Results from last 7 days   Lab Units 09/15/20  1527 09/15/20  1512 09/14/20  1228   BLOOD CULTURE  Received in Microbiology Lab  Culture in Progress  Received in Microbiology Lab  Culture in Progress    --    URINE CULTURE   --   --  70,000-79,000 cfu/ml Staphylococcus coagulase negative*  50,000-59,000 cfu/ml Gram Negative Riki Enteric Like*     ED Treatment:   Medication Administration from 09/15/2020 1356 to 09/15/2020 1956    Date/Time Order Dose Route Action   09/15/2020 1618 sodium chloride 0 9 % bolus 1,000 mL 1,000 mL Intravenous New Bag   09/15/2020 1432 ibuprofen (MOTRIN) tablet 400 mg 400 mg Oral Given   09/15/2020 1615 ketorolac (TORADOL) injection 15 mg 15 mg Intravenous Given   09/15/2020 1602 iohexol (OMNIPAQUE) 350 MG/ML injection (MULTI-DOSE) 100 mL 100 mL Intravenous Given   09/15/2020 1622 ceftriaxone (ROCEPHIN) 1 g/50 mL in dextrose IVPB 1,000 mg Intravenous New Bag   09/15/2020 1812 ketorolac (TORADOL) injection 15 mg 15 mg Intravenous Given   09/15/2020 1812 ondansetron (ZOFRAN) injection 4 mg 4 mg Intravenous Given   09/15/2020 1931 HYDROmorphone (DILAUDID) injection 0 5 mg 0 5 mg Intravenous Given        Past Medical History:   Diagnosis Date    Anxiety     Asthma     Chronic kidney disease     stones    Migraine     Mood disorder (HCC)     Panic anxiety syndrome     [facility]icide attempt ([facility] [address] )     Varicella     childhood     Present on Admission:   Nephrolithiasis   Headache    Admitting Diagnosis: Nephrolithiasis [N20 0]  Pyelonephritis [N12]  Fever [R50 9]     Age/Sex: 35 y o  female     Admission Orders:    Scheduled Medications:  cefTRIAXone, 1,000 mg, Intravenous, Q24H      Continuous IV Infusions:  sodium chloride, 125 mL/hr, Intravenous, Continuous      PRN Meds:  acetaminophen, 650 mg, Oral, Q6H PRN -x 1 9/15, 9/16  ketorolac, 15 mg, Intravenous, Q6H PRN - x 2 9/16  morphine injection, 2 mg, Intravenous, Q6H PRN - x 1 9/15, 9/16  ondansetron, 4 mg, Intravenous, Q6H PRN  - x 1 9/15, 9/16    SCDs  Ambulate q shift   Regular diet   IP CONSULT TO UROLOGY    Network Utilization Review Department  [email or web address]  ATTENTION: Please call with any questions or concerns to [phone] and carefully listen to the prompts so that you are directed to the right person  All voicemails are confidential   Jesus Estrada all requests for admission clinical reviews, approved or denied determinations and any other requests to dedicated fax number below belonging to the campus where the patient is receiving treatment   List of dedicated fax numbers for the Facilities:  [address] [place] [address] DENIALS (Administrative/Medical Necessity) [phone]   [address] (Maternity/NICU/Pediatrics) [phone]   [place] [address] [phone]   [facility] [phone]     [address] [phone]   [facility]  [phone] [address] [facility]  [phone]   [address] [place] [address] [phone]

## 2020-09-17 VITALS
SYSTOLIC BLOOD PRESSURE: 124 MMHG | DIASTOLIC BLOOD PRESSURE: 78 MMHG | HEART RATE: 80 BPM | HEIGHT: 64 IN | OXYGEN SATURATION: 99 % | WEIGHT: 154.1 LBS | BODY MASS INDEX: 26.31 KG/M2 | TEMPERATURE: 101.8 F | RESPIRATION RATE: 18 BRPM

## 2020-09-17 PROBLEM — E87.6 HYPOKALEMIA: Status: ACTIVE | Noted: 2020-09-17

## 2020-09-17 LAB
ANION GAP SERPL CALCULATED.3IONS-SCNC: 10 MMOL/L (ref 4–13)
BASOPHILS # BLD AUTO: 0.02 THOUSANDS/ΜL (ref 0–0.1)
BASOPHILS NFR BLD AUTO: 0 % (ref 0–1)
BUN SERPL-MCNC: 4 MG/DL (ref 5–25)
CALCIUM SERPL-MCNC: 7.8 MG/DL (ref 8.3–10.1)
CHLORIDE SERPL-SCNC: 106 MMOL/L (ref 100–108)
CO2 SERPL-SCNC: 21 MMOL/L (ref 21–32)
CREAT SERPL-MCNC: 0.57 MG/DL (ref 0.6–1.3)
EOSINOPHIL # BLD AUTO: 0.01 THOUSAND/ΜL (ref 0–0.61)
EOSINOPHIL NFR BLD AUTO: 0 % (ref 0–6)
ERYTHROCYTE [DISTWIDTH] IN BLOOD BY AUTOMATED COUNT: 11.9 % (ref 11.6–15.1)
GFR SERPL CREATININE-BSD FRML MDRD: 126 ML/MIN/1.73SQ M
GLUCOSE SERPL-MCNC: 89 MG/DL (ref 65–140)
HCT VFR BLD AUTO: 35.2 % (ref 34.8–46.1)
HGB BLD-MCNC: 11.7 G/DL (ref 11.5–15.4)
IMM GRANULOCYTES # BLD AUTO: 0.06 THOUSAND/UL (ref 0–0.2)
IMM GRANULOCYTES NFR BLD AUTO: 1 % (ref 0–2)
LYMPHOCYTES # BLD AUTO: 0.54 THOUSANDS/ΜL (ref 0.6–4.47)
LYMPHOCYTES NFR BLD AUTO: 5 % (ref 14–44)
MCH RBC QN AUTO: 31.7 PG (ref 26.8–34.3)
MCHC RBC AUTO-ENTMCNC: 33.2 G/DL (ref 31.4–37.4)
MCV RBC AUTO: 95 FL (ref 82–98)
MONOCYTES # BLD AUTO: 1.43 THOUSAND/ΜL (ref 0.17–1.22)
MONOCYTES NFR BLD AUTO: 13 % (ref 4–12)
NEUTROPHILS # BLD AUTO: 9.37 THOUSANDS/ΜL (ref 1.85–7.62)
NEUTS SEG NFR BLD AUTO: 81 % (ref 43–75)
NRBC BLD AUTO-RTO: 0 /100 WBCS
PLATELET # BLD AUTO: 173 THOUSANDS/UL (ref 149–390)
PMV BLD AUTO: 10.5 FL (ref 8.9–12.7)
POTASSIUM SERPL-SCNC: 3.1 MMOL/L (ref 3.5–5.3)
RBC # BLD AUTO: 3.69 MILLION/UL (ref 3.81–5.12)
SODIUM SERPL-SCNC: 137 MMOL/L (ref 136–145)
WBC # BLD AUTO: 11.43 THOUSAND/UL (ref 4.31–10.16)

## 2020-09-17 PROCEDURE — 99239 HOSP IP/OBS DSCHRG MGMT >30: CPT | Performed by: PHYSICIAN ASSISTANT

## 2020-09-17 PROCEDURE — 80048 BASIC METABOLIC PNL TOTAL CA: CPT | Performed by: PHYSICIAN ASSISTANT

## 2020-09-17 PROCEDURE — 85025 COMPLETE CBC W/AUTO DIFF WBC: CPT | Performed by: PHYSICIAN ASSISTANT

## 2020-09-17 RX ORDER — PROMETHAZINE HYDROCHLORIDE 25 MG/1
25 TABLET ORAL EVERY 6 HOURS PRN
Status: DISCONTINUED | OUTPATIENT
Start: 2020-09-17 | End: 2020-09-17 | Stop reason: HOSPADM

## 2020-09-17 RX ORDER — ACETAMINOPHEN 325 MG/1
650 TABLET ORAL EVERY 6 HOURS PRN
Qty: 30 TABLET | Refills: 0
Start: 2020-09-17 | End: 2021-06-25

## 2020-09-17 RX ORDER — SULFAMETHOXAZOLE AND TRIMETHOPRIM 800; 160 MG/1; MG/1
1 TABLET ORAL EVERY 12 HOURS SCHEDULED
Status: DISCONTINUED | OUTPATIENT
Start: 2020-09-17 | End: 2020-09-17 | Stop reason: HOSPADM

## 2020-09-17 RX ORDER — SULFAMETHOXAZOLE AND TRIMETHOPRIM 800; 160 MG/1; MG/1
1 TABLET ORAL EVERY 12 HOURS SCHEDULED
Qty: 14 TABLET | Refills: 0 | Status: SHIPPED | OUTPATIENT
Start: 2020-09-17 | End: 2020-09-24

## 2020-09-17 RX ORDER — OXYCODONE HYDROCHLORIDE AND ACETAMINOPHEN 5; 325 MG/1; MG/1
1 TABLET ORAL EVERY 6 HOURS PRN
Qty: 5 TABLET | Refills: 0 | Status: SHIPPED | OUTPATIENT
Start: 2020-09-17 | End: 2020-09-19

## 2020-09-17 RX ORDER — POTASSIUM CHLORIDE 20 MEQ/1
40 TABLET, EXTENDED RELEASE ORAL ONCE
Status: COMPLETED | OUTPATIENT
Start: 2020-09-17 | End: 2020-09-17

## 2020-09-17 RX ORDER — PROMETHAZINE HYDROCHLORIDE 25 MG/1
25 TABLET ORAL EVERY 6 HOURS PRN
Qty: 30 TABLET | Refills: 0 | Status: SHIPPED | OUTPATIENT
Start: 2020-09-17 | End: 2021-06-25

## 2020-09-17 RX ADMIN — SODIUM CHLORIDE 125 ML/HR: 0.9 INJECTION, SOLUTION INTRAVENOUS at 05:31

## 2020-09-17 RX ADMIN — ONDANSETRON 4 MG: 2 INJECTION INTRAMUSCULAR; INTRAVENOUS at 01:55

## 2020-09-17 RX ADMIN — SULFAMETHOXAZOLE AND TRIMETHOPRIM 1 TABLET: 800; 160 TABLET ORAL at 10:59

## 2020-09-17 RX ADMIN — POTASSIUM CHLORIDE 40 MEQ: 1500 TABLET, EXTENDED RELEASE ORAL at 10:59

## 2020-09-17 RX ADMIN — ACETAMINOPHEN 650 MG: 325 TABLET, FILM COATED ORAL at 10:58

## 2020-09-17 RX ADMIN — KETOROLAC TROMETHAMINE 15 MG: 30 INJECTION, SOLUTION INTRAMUSCULAR at 02:37

## 2020-09-17 NOTE — DISCHARGE SUMMARY
Discharge- Jay Alicea 1990, 35 y o  female MRN: [id number]    Unit/Bed#: W -01 Encounter: [id number]    Primary Care Provider: No primary care provider on file  Date and time admitted to hospital: 9/15/2020  2:10 PM  * Sepsis [facility])  Assessment & Plan  · Patient had sepsis criteria present on admission including high-grade fever, tachycardia, and leukocytosis  Patient persisting in having fever this morning, but overall fever curve has come down and she is currently 101 8 and clinically looks quite well currently  · Suspected source is felt to be right-sided pyelonephritis  · Patient got 2 days of IV ceftriaxone  Reviewed final urine cultures with Infectious Disease; patient with coag-negative staph and enterobacter  Will convert to oral Bactrim x7 more days   · Thus far negative blood cultures  · Noted that she recently had a course of PO cefdinir    Hypokalemia  Assessment & Plan  · Potassium 3 1, replete orally    Pyelonephritis  Assessment & Plan  · Patient presented with sepsis criteria and right-sided pain; noted that patient recently had instrumentation/ right-sided stent placed  · CT scan abdomen pelvis consistent with right-sided pyelonephritis  · Continue supportive care with po fluids, po antiemetics, po pain medication    Nephrolithiasis  Assessment & Plan  · Patient had a stent placed 2 weeks ago  In October she is scheduled for follow-up definitive management  Appreciate urology input  Patient does not require any additional urologic intervention immediately    Headache  Assessment & Plan  · Patient has history of migraines  Headache present on admission was likely exacerbated by dehydration and sepsis/pyelonephritis  Patient now reports improvement    Discharging Physician / Practitioner: Deana Lozano PA-C  PCP: No primary care provider on file    Admission Date:   Admission Orders (From admission, onward)     Ordered        09/15/20 1813  Inpatient Admission  Once Discharge Date: 09/17/20    Resolved Problems  Date Reviewed: 9/17/2020    None          Consultations During Hospital Stay:  · Urology    Procedures Performed:   · CT scan as above    Significant Findings / Test Results:   · As above    Incidental Findings:   · None     Test Results Pending at Discharge (will require follow up): · Finalized blood cultures     Outpatient Tests Requested:  · None    Complications:  None    Reason for Admission:  Fever and flank pain    Hospital Course:     Hali Sun is a 35 y o  female patient who originally presented to the hospital on 9/15/2020 due to high fever, chills, nausea, vomiting associated with right-sided flank pain  Patient recently had right-sided stent placed for kidney stones  She completed a full course of oral Omnicef but unfortunately presented back on this admission with sepsis criteria and CT scan evidence of right-sided pyelonephritis  She was placed on IV ceftriaxone  Clinically she did improve and was transitioned to oral Bactrim at discharge per my conversation and review of final sensitivities with Infectious Disease  She is planned for further follow-up with Urology and will also be provided prescriptions for pain medications and antiemetics  Urology saw the patient during this hospitalization and did not feel that any other immediate procedure was required at this time  Please see above list of diagnoses and related plan for additional information  Condition at Discharge: stable     Discharge Day Visit / Exam:     Subjective:  Patient reports that she had a bad night  She was having pain again as well as difficulty sleeping and nausea and vomiting  She did not vomit any blood  Her temperature has not been as high as it was before  She did keep breakfast down this morning and is feeling overall a bit better    She states that she would really like to go home today even though she is still running a little bit of fever as she can eat and drink and rest better at home  She does understand the parameters by which she should return to the hospital  Vitals: Blood Pressure: 124/78 (09/17/20 0744)  Pulse: 80 (09/17/20 0744)  Temperature: (!) 101 8 °F (38 8 °C) (09/17/20 1010)  Temp Source: Oral (09/17/20 0744)  Respirations: 18 (09/17/20 0744)  Height: 5' 4" (162 6 cm) (09/15/20 1412)  Weight - Scale: 69 9 kg (154 lb 1 6 oz) (09/15/20 1412)  SpO2: 99 % (09/17/20 0744)  Exam:   Physical Exam  Vitals signs reviewed  Constitutional:       General: She is not in acute distress  Appearance: Normal appearance  She is normal weight  She is not ill-appearing, toxic-appearing or diaphoretic  Comments: Clinically appeared quite well this morning   HENT:      Head: Normocephalic  Nose: No congestion or rhinorrhea  Mouth/Throat:      Pharynx: No oropharyngeal exudate or posterior oropharyngeal erythema  Eyes:      General: No scleral icterus  Right eye: No discharge  Left eye: No discharge  Neck:      Musculoskeletal: Neck supple  Cardiovascular:      Rate and Rhythm: Normal rate and regular rhythm  Heart sounds: No murmur  Pulmonary:      Effort: Pulmonary effort is normal  No respiratory distress  Breath sounds: Normal breath sounds  No wheezing, rhonchi or rales  Abdominal:      General: There is no distension  Musculoskeletal:      Right lower leg: No edema  Left lower leg: No edema  Skin:     General: Skin is warm and dry  Coloration: Skin is not jaundiced or pale  Findings: No bruising, erythema, lesion or rash  Neurological:      General: No focal deficit present  Mental Status: She is alert  Comments: Awake alert interactive, no confusion noted  Good historian  Psychiatric:         Mood and Affect: Mood normal          Thought Content:  Thought content normal       Comments: Very pleasant cooperative         Discussion with Family:     Discharge instructions/Information to patient and family:   See after visit summary for information provided to patient and family  Provisions for Follow-Up Care:  See after visit summary for information related to follow-up care and any pertinent home health orders  Disposition:     Home    Planned Readmission: October for urologic intervention     Discharge Statement:  I spent 40 minutes discharging the patient  This time was spent on the day of discharge  I had direct contact with the patient on the day of discharge  Greater than 50% of the total time was spent examining patient, answering all patient questions, arranging and discussing plan of care with patient as well as directly providing post-discharge instructions  Additional time then spent on discharge activities  Bedside nursing rounds performed  Case was discussed with Infectious Disease and with case management    Discharge Medications:  See after visit summary for reconciled discharge medications provided to patient and family        ** Please Note: This note has been constructed using a voice recognition system **

## 2020-09-17 NOTE — ASSESSMENT & PLAN NOTE
· Patient presented with sepsis criteria and right-sided pain; noted that patient recently had instrumentation/ right-sided stent placed  · CT scan abdomen pelvis consistent with right-sided pyelonephritis  · Continue supportive care with po fluids, po antiemetics, po pain medication

## 2020-09-17 NOTE — ASSESSMENT & PLAN NOTE
· Patient had sepsis criteria present on admission including high-grade fever, tachycardia, and leukocytosis  Patient persisting in having fever this morning, but overall fever curve has come down and she is currently 101 8 and clinically looks quite well currently  · Suspected source is felt to be right-sided pyelonephritis  · Patient got 2 days of IV ceftriaxone  Reviewed final urine cultures with Infectious Disease; patient with coag-negative staph and enterobacter    Will convert to oral Bactrim x7 more days   · Thus far negative blood cultures  · Noted that she recently had a course of PO cefdinir

## 2020-09-17 NOTE — ASSESSMENT & PLAN NOTE
· Patient has history of migraines  Headache present on admission was likely exacerbated by dehydration and sepsis/pyelonephritis    Patient now reports improvement

## 2020-09-17 NOTE — DISCHARGE INSTR - AVS FIRST PAGE
Dear Jadiel Beltrán,     It was our pleasure to care for you here at [facility]  It is our hope that we were always able to exceed the expected standards for your care during your stay  You were hospitalized due to kidney infection  You were cared for on the [address] [place] [address] 4th floor by Tito Robertson PA-C under the service of Shweta Muhammad MD with the [facility] who covers for your primary care physician (PCP), No primary care provider on file  , while you were hospitalized  If you have any questions or concerns related to this hospitalization, you may contact us at [phone]  For follow up as well as any medication refills, we recommend that you follow up with your primary care physician  A registered nurse will reach out to you by phone within a few days after your discharge to answer any additional questions that you may have after going home  However, at this time we provide for you here, the most important instructions / recommendations at discharge:     · Notable Medication Adjustments -   · Bactrim (antibiotic) course should be completed  · Take tylenol for mild pain, percocet for very severe pain  · Phenergan for nausea  · Testing Required after Discharge -   · Defer to urology  · Important follow up information -   ·   · Other Instructions -   · Stay hydrated, drink gatorade  · Please review this entire after visit summary as additional general instructions including medication list, appointments, activity, diet, any pertinent wound care, and other additional recommendations from your care team that may be provided for you        Sincerely,     Tito Robertson PA-C

## 2020-09-20 LAB
BACTERIA BLD CULT: NORMAL
BACTERIA BLD CULT: NORMAL

## 2020-09-28 ENCOUNTER — TELEPHONE (OUTPATIENT)
Dept: UROLOGY | Facility: AMBULATORY SURGERY CENTER | Age: 30
End: 2020-09-28

## 2020-09-28 NOTE — TELEPHONE ENCOUNTER
Patient calling to see if she is ok with still having surgery since she was in [place]  Advised doctor is out of office and Mahendra Carroll will reach out to her as soon as she gets answer from provider  Patient understood

## 2020-09-29 NOTE — TELEPHONE ENCOUNTER
LMOM for patient informing her that ok to proceed with surgery scheduled 10/2 @ [facility] as long as she is not febrile as per Dr Raeann Kim note  Provided # for return call

## 2020-09-29 NOTE — TELEPHONE ENCOUNTER
Please review and advise  Patient recent hospitalization and has upcoming surgery 10/2 R #5  Can she proceed with Surgery?

## 2020-09-30 RX ORDER — OXYCODONE HYDROCHLORIDE AND ACETAMINOPHEN 5; 325 MG/1; MG/1
1 TABLET ORAL EVERY 4 HOURS PRN
COMMUNITY
End: 2020-10-02 | Stop reason: HOSPADM

## 2020-10-02 ENCOUNTER — ANESTHESIA EVENT (OUTPATIENT)
Dept: PERIOP | Facility: HOSPITAL | Age: 30
End: 2020-10-02
Payer: COMMERCIAL

## 2020-10-02 ENCOUNTER — HOSPITAL ENCOUNTER (OUTPATIENT)
Facility: HOSPITAL | Age: 30
Setting detail: OUTPATIENT SURGERY
Discharge: HOME | End: 2020-10-02
Attending: UROLOGY | Admitting: UROLOGY
Payer: COMMERCIAL

## 2020-10-02 ENCOUNTER — APPOINTMENT (OUTPATIENT)
Dept: RADIOLOGY | Facility: HOSPITAL | Age: 30
End: 2020-10-02
Payer: COMMERCIAL

## 2020-10-02 ENCOUNTER — ANESTHESIA (OUTPATIENT)
Dept: PERIOP | Facility: HOSPITAL | Age: 30
End: 2020-10-02
Payer: COMMERCIAL

## 2020-10-02 VITALS
WEIGHT: 155 LBS | HEART RATE: 78 BPM | TEMPERATURE: 97.5 F | OXYGEN SATURATION: 99 % | BODY MASS INDEX: 26.46 KG/M2 | SYSTOLIC BLOOD PRESSURE: 120 MMHG | HEIGHT: 64 IN | DIASTOLIC BLOOD PRESSURE: 72 MMHG | RESPIRATION RATE: 14 BRPM

## 2020-10-02 DIAGNOSIS — N20.0 NEPHROLITHIASIS: Primary | ICD-10-CM

## 2020-10-02 LAB
EXT PREGNANCY TEST URINE: NEGATIVE
EXT. CONTROL: NORMAL

## 2020-10-02 PROCEDURE — 82360 CALCULUS ASSAY QUANT: CPT | Performed by: UROLOGY

## 2020-10-02 PROCEDURE — 81025 URINE PREGNANCY TEST: CPT | Performed by: UROLOGY

## 2020-10-02 PROCEDURE — C1758 CATHETER, URETERAL: HCPCS | Performed by: UROLOGY

## 2020-10-02 PROCEDURE — 52356 CYSTO/URETERO W/LITHOTRIPSY: CPT | Performed by: UROLOGY

## 2020-10-02 PROCEDURE — C2617 STENT, NON-COR, TEM W/O DEL: HCPCS | Performed by: UROLOGY

## 2020-10-02 PROCEDURE — C1769 GUIDE WIRE: HCPCS | Performed by: UROLOGY

## 2020-10-02 PROCEDURE — 74420 UROGRAPHY RTRGR +-KUB: CPT

## 2020-10-02 DEVICE — STENT URETERAL 6 FR 26CM INLAY OPTIMA: Type: IMPLANTABLE DEVICE | Site: URETER | Status: FUNCTIONAL

## 2020-10-02 RX ORDER — KETOROLAC TROMETHAMINE 30 MG/ML
INJECTION, SOLUTION INTRAMUSCULAR; INTRAVENOUS AS NEEDED
Status: DISCONTINUED | OUTPATIENT
Start: 2020-10-02 | End: 2020-10-02

## 2020-10-02 RX ORDER — PROPOFOL 10 MG/ML
INJECTION, EMULSION INTRAVENOUS AS NEEDED
Status: DISCONTINUED | OUTPATIENT
Start: 2020-10-02 | End: 2020-10-02

## 2020-10-02 RX ORDER — SODIUM CHLORIDE, SODIUM LACTATE, POTASSIUM CHLORIDE, CALCIUM CHLORIDE 600; 310; 30; 20 MG/100ML; MG/100ML; MG/100ML; MG/100ML
INJECTION, SOLUTION INTRAVENOUS CONTINUOUS PRN
Status: DISCONTINUED | OUTPATIENT
Start: 2020-10-02 | End: 2020-10-02

## 2020-10-02 RX ORDER — OXYCODONE HYDROCHLORIDE AND ACETAMINOPHEN 5; 325 MG/1; MG/1
1 TABLET ORAL EVERY 4 HOURS PRN
Qty: 5 TABLET | Refills: 0 | Status: SHIPPED | OUTPATIENT
Start: 2020-10-02 | End: 2020-10-04

## 2020-10-02 RX ORDER — FENTANYL CITRATE 50 UG/ML
INJECTION, SOLUTION INTRAMUSCULAR; INTRAVENOUS AS NEEDED
Status: DISCONTINUED | OUTPATIENT
Start: 2020-10-02 | End: 2020-10-02

## 2020-10-02 RX ORDER — FENTANYL CITRATE/PF 50 MCG/ML
50 SYRINGE (ML) INJECTION
Status: DISCONTINUED | OUTPATIENT
Start: 2020-10-02 | End: 2020-10-02 | Stop reason: HOSPADM

## 2020-10-02 RX ORDER — LIDOCAINE HYDROCHLORIDE 10 MG/ML
INJECTION, SOLUTION EPIDURAL; INFILTRATION; INTRACAUDAL; PERINEURAL AS NEEDED
Status: DISCONTINUED | OUTPATIENT
Start: 2020-10-02 | End: 2020-10-02

## 2020-10-02 RX ORDER — GENTAMICIN SULFATE 100 MG/100ML
100 INJECTION, SOLUTION INTRAVENOUS ONCE
Status: COMPLETED | OUTPATIENT
Start: 2020-10-02 | End: 2020-10-02

## 2020-10-02 RX ORDER — SULFAMETHOXAZOLE AND TRIMETHOPRIM 800; 160 MG/1; MG/1
1 TABLET ORAL 2 TIMES DAILY
Qty: 10 TABLET | Refills: 0 | Status: SHIPPED | OUTPATIENT
Start: 2020-10-02 | End: 2020-10-07

## 2020-10-02 RX ORDER — OXYCODONE HYDROCHLORIDE AND ACETAMINOPHEN 5; 325 MG/1; MG/1
1 TABLET ORAL EVERY 4 HOURS PRN
Status: DISCONTINUED | OUTPATIENT
Start: 2020-10-02 | End: 2020-10-02 | Stop reason: HOSPADM

## 2020-10-02 RX ORDER — MIDAZOLAM HYDROCHLORIDE 2 MG/2ML
INJECTION, SOLUTION INTRAMUSCULAR; INTRAVENOUS AS NEEDED
Status: DISCONTINUED | OUTPATIENT
Start: 2020-10-02 | End: 2020-10-02

## 2020-10-02 RX ORDER — MAGNESIUM HYDROXIDE 1200 MG/15ML
LIQUID ORAL AS NEEDED
Status: DISCONTINUED | OUTPATIENT
Start: 2020-10-02 | End: 2020-10-02 | Stop reason: HOSPADM

## 2020-10-02 RX ORDER — DEXAMETHASONE SODIUM PHOSPHATE 10 MG/ML
INJECTION, SOLUTION INTRAMUSCULAR; INTRAVENOUS AS NEEDED
Status: DISCONTINUED | OUTPATIENT
Start: 2020-10-02 | End: 2020-10-02

## 2020-10-02 RX ORDER — ONDANSETRON 2 MG/ML
4 INJECTION INTRAMUSCULAR; INTRAVENOUS ONCE AS NEEDED
Status: DISCONTINUED | OUTPATIENT
Start: 2020-10-02 | End: 2020-10-02 | Stop reason: HOSPADM

## 2020-10-02 RX ORDER — ONDANSETRON 2 MG/ML
INJECTION INTRAMUSCULAR; INTRAVENOUS AS NEEDED
Status: DISCONTINUED | OUTPATIENT
Start: 2020-10-02 | End: 2020-10-02

## 2020-10-02 RX ORDER — HYDROMORPHONE HCL/PF 1 MG/ML
0.5 SYRINGE (ML) INJECTION
Status: DISCONTINUED | OUTPATIENT
Start: 2020-10-02 | End: 2020-10-02 | Stop reason: HOSPADM

## 2020-10-02 RX ORDER — SODIUM CHLORIDE, SODIUM LACTATE, POTASSIUM CHLORIDE, CALCIUM CHLORIDE 600; 310; 30; 20 MG/100ML; MG/100ML; MG/100ML; MG/100ML
50 INJECTION, SOLUTION INTRAVENOUS CONTINUOUS
Status: DISCONTINUED | OUTPATIENT
Start: 2020-10-02 | End: 2020-10-02 | Stop reason: HOSPADM

## 2020-10-02 RX ORDER — SODIUM CHLORIDE, SODIUM LACTATE, POTASSIUM CHLORIDE, CALCIUM CHLORIDE 600; 310; 30; 20 MG/100ML; MG/100ML; MG/100ML; MG/100ML
75 INJECTION, SOLUTION INTRAVENOUS CONTINUOUS
Status: ACTIVE | OUTPATIENT
Start: 2020-10-02 | End: 2020-10-02

## 2020-10-02 RX ORDER — LIDOCAINE HYDROCHLORIDE 10 MG/ML
0.5 INJECTION, SOLUTION EPIDURAL; INFILTRATION; INTRACAUDAL; PERINEURAL ONCE AS NEEDED
Status: COMPLETED | OUTPATIENT
Start: 2020-10-02 | End: 2020-10-02

## 2020-10-02 RX ORDER — SCOLOPAMINE TRANSDERMAL SYSTEM 1 MG/1
1 PATCH, EXTENDED RELEASE TRANSDERMAL ONCE
Status: DISCONTINUED | OUTPATIENT
Start: 2020-10-02 | End: 2020-10-02 | Stop reason: HOSPADM

## 2020-10-02 RX ADMIN — SCOPALAMINE 1 PATCH: 1 PATCH, EXTENDED RELEASE TRANSDERMAL at 08:51

## 2020-10-02 RX ADMIN — SODIUM CHLORIDE, SODIUM LACTATE, POTASSIUM CHLORIDE, AND CALCIUM CHLORIDE 50 ML/HR: .6; .31; .03; .02 INJECTION, SOLUTION INTRAVENOUS at 09:29

## 2020-10-02 RX ADMIN — PROPOFOL 200 MG: 10 INJECTION, EMULSION INTRAVENOUS at 10:21

## 2020-10-02 RX ADMIN — ONDANSETRON 4 MG: 2 INJECTION INTRAMUSCULAR; INTRAVENOUS at 10:28

## 2020-10-02 RX ADMIN — MIDAZOLAM 2 MG: 1 INJECTION INTRAMUSCULAR; INTRAVENOUS at 10:17

## 2020-10-02 RX ADMIN — FENTANYL CITRATE 50 MCG: 50 INJECTION, SOLUTION INTRAMUSCULAR; INTRAVENOUS at 10:37

## 2020-10-02 RX ADMIN — GENTAMICIN SULFATE 100 MG: 100 INJECTION, SOLUTION INTRAVENOUS at 10:11

## 2020-10-02 RX ADMIN — DEXAMETHASONE SODIUM PHOSPHATE 5 MG: 10 INJECTION, SOLUTION INTRAMUSCULAR; INTRAVENOUS at 10:28

## 2020-10-02 RX ADMIN — FENTANYL CITRATE 25 MCG: 50 INJECTION, SOLUTION INTRAMUSCULAR; INTRAVENOUS at 10:28

## 2020-10-02 RX ADMIN — LIDOCAINE HYDROCHLORIDE 50 MG: 10 INJECTION, SOLUTION EPIDURAL; INFILTRATION; INTRACAUDAL; PERINEURAL at 10:21

## 2020-10-02 RX ADMIN — SODIUM CHLORIDE, SODIUM LACTATE, POTASSIUM CHLORIDE, AND CALCIUM CHLORIDE: .6; .31; .03; .02 INJECTION, SOLUTION INTRAVENOUS at 08:27

## 2020-10-02 RX ADMIN — LIDOCAINE HYDROCHLORIDE 0.5 ML: 10 INJECTION, SOLUTION EPIDURAL; INFILTRATION; INTRACAUDAL; PERINEURAL at 09:29

## 2020-10-02 RX ADMIN — KETOROLAC TROMETHAMINE 30 MG: 30 INJECTION, SOLUTION INTRAMUSCULAR at 10:51

## 2020-10-05 NOTE — TELEPHONE ENCOUNTER
Post Op Note    Anam Maria is a 35 y o  female s/p CYSTOSCOPY URETEROSCOPY WITH LITHOTRIPSY HOLMIUM LASER, RETROGRADE PYELOGRAM AND INSERTION STENT URETERAL (Right) performed 10/2/2020  Anam Maria is a patient of Dr Beckie Holt and is seen at the [facility] office  How would you rate your pain on a scale from 1 to 10, 10 being the worst pain ever? 6  Have you had a fever? Yes  If so, what was your highest temperature? 100 5 F What is your current temperature? 80 F  Have your bowel movements been regular? Yes  Do you have any difficulty urinating? No    Do you have any other questions or concerns that I can address at this time? No       Per op note from Dr Beckie Holt, "Plan:  Stent/string removal next week "    Patient states she feels comfortable removing this at home  Reviewed instructions on how to do so  Patient will remove on Wednesday, patient to call office once stent has been removed  Reviewed post stent removal symptoms including flank pain, nausea, dysuria, and hematuria  Instructed to increase PO fluids  Take NSAIDS and other prescribed pain medication PRN  Encouraged to call with for uncontrolled pain and fever      When patient returns call, will need to be scheduled for 6 month follow up with US and KUB ptv (ok with AP)

## 2020-10-05 NOTE — TELEPHONE ENCOUNTER
Patient called in stating she is still experiencing a lot of pain anywhere from 6 to 8 depending on what type of activity she is doing  Patient stated the pain is worse during the night  She also stated she ran a fever of 105 over the weekend which she took tylenol and lowered the fever  Patient stated she does not have any more pain medication   Patient can be reached at [phone]

## 2020-10-06 ENCOUNTER — TELEPHONE (OUTPATIENT)
Dept: UROLOGY | Facility: MEDICAL CENTER | Age: 30
End: 2020-10-06

## 2020-10-07 NOTE — TELEPHONE ENCOUNTER
Rui Sanabria 16 hours ago (4:40 PM)          Patient called in stating she can't take the stent out herself and would like to know if she can make a appointment for stent removal  Patient can be reached at [phone]          Call placed to patient, she has decided that she does not feel comfortable removing her own stent at home and would like to schedule nurse visit for tomorrow  Patient scheduled for 2pm at [facility] 10/8/2020

## 2020-10-08 ENCOUNTER — PROCEDURE VISIT (OUTPATIENT)
Dept: UROLOGY | Facility: AMBULATORY SURGERY CENTER | Age: 30
End: 2020-10-08
Payer: COMMERCIAL

## 2020-10-08 VITALS
SYSTOLIC BLOOD PRESSURE: 122 MMHG | HEART RATE: 98 BPM | HEIGHT: 64 IN | WEIGHT: 155 LBS | DIASTOLIC BLOOD PRESSURE: 72 MMHG | TEMPERATURE: 97.1 F | BODY MASS INDEX: 26.46 KG/M2

## 2020-10-08 DIAGNOSIS — N20.0 NEPHROLITHIASIS: Primary | ICD-10-CM

## 2020-10-08 PROCEDURE — 99211 OFF/OP EST MAY X REQ PHY/QHP: CPT

## 2020-10-13 LAB
COLOR STONE: NORMAL
COMMENT-STONE3: NORMAL
COMPOSITION: NORMAL
HYDROXYAPATITE 24H ENGDIFF UR: 100 %
LABORATORY COMMENT REPORT: NORMAL
PHOTO: NORMAL
SIZE STONE: NORMAL MM
SPEC SOURCE SUBJ: NORMAL
STONE ANALYSIS-IMP: NORMAL
WT STONE: 8 MG

## 2021-03-30 DIAGNOSIS — Z23 ENCOUNTER FOR IMMUNIZATION: ICD-10-CM

## 2021-04-01 ENCOUNTER — IMMUNIZATIONS (OUTPATIENT)
Dept: FAMILY MEDICINE CLINIC | Facility: HOSPITAL | Age: 31
End: 2021-04-01

## 2021-04-01 DIAGNOSIS — Z23 ENCOUNTER FOR IMMUNIZATION: Primary | ICD-10-CM

## 2021-04-01 PROCEDURE — 0001A SARS-COV-2 / COVID-19 MRNA VACCINE (PFIZER-BIONTECH) 30 MCG: CPT

## 2021-04-01 PROCEDURE — 91300 SARS-COV-2 / COVID-19 MRNA VACCINE (PFIZER-BIONTECH) 30 MCG: CPT

## 2021-04-14 ENCOUNTER — TELEPHONE (OUTPATIENT)
Dept: UROLOGY | Facility: CLINIC | Age: 31
End: 2021-04-14

## 2021-04-14 NOTE — TELEPHONE ENCOUNTER
Left a voicemail to have patient get KUB xray and ultrasound  I mentioned in the message that the KUB does not need an appointment but ultrasounds DO need an appointment  Scheduling number was provided as well as our office phone number if she has any questions or concerns

## 2021-04-16 ENCOUNTER — TELEPHONE (OUTPATIENT)
Dept: UROLOGY | Facility: AMBULATORY SURGERY CENTER | Age: 31
End: 2021-04-16

## 2021-04-16 NOTE — TELEPHONE ENCOUNTER
----- Message from Beverly Cortes sent at 4/16/2021  9:16 AM EDT -----  Regarding: R/S  I have Cx the appointment for Monday  The patient would like someone to call back and R/S the appointment  LMOM for Patient to call Office Serenity Stoner has scheduled the US and KUB ordered for PTV  Then we will r/s he OV  Provided # for CS and office

## 2021-04-26 ENCOUNTER — IMMUNIZATIONS (OUTPATIENT)
Dept: FAMILY MEDICINE CLINIC | Facility: HOSPITAL | Age: 31
End: 2021-04-26

## 2021-04-26 DIAGNOSIS — Z23 ENCOUNTER FOR IMMUNIZATION: Primary | ICD-10-CM

## 2021-04-26 PROCEDURE — 0002A SARS-COV-2 / COVID-19 MRNA VACCINE (PFIZER-BIONTECH) 30 MCG: CPT

## 2021-04-26 PROCEDURE — 91300 SARS-COV-2 / COVID-19 MRNA VACCINE (PFIZER-BIONTECH) 30 MCG: CPT

## 2021-06-28 ENCOUNTER — OFFICE VISIT (OUTPATIENT)
Dept: FAMILY MEDICINE CLINIC | Facility: CLINIC | Age: 31
End: 2021-06-28
Payer: COMMERCIAL

## 2021-06-28 VITALS
WEIGHT: 158.6 LBS | BODY MASS INDEX: 27.08 KG/M2 | HEIGHT: 64 IN | DIASTOLIC BLOOD PRESSURE: 80 MMHG | SYSTOLIC BLOOD PRESSURE: 120 MMHG | RESPIRATION RATE: 18 BRPM | HEART RATE: 64 BPM | OXYGEN SATURATION: 99 %

## 2021-06-28 DIAGNOSIS — Z00.00 ANNUAL PHYSICAL EXAM: Primary | ICD-10-CM

## 2021-06-28 DIAGNOSIS — Z13.6 SCREENING FOR CARDIOVASCULAR CONDITION: ICD-10-CM

## 2021-06-28 DIAGNOSIS — G43.909 MIGRAINE WITHOUT STATUS MIGRAINOSUS, NOT INTRACTABLE, UNSPECIFIED MIGRAINE TYPE: ICD-10-CM

## 2021-06-28 DIAGNOSIS — E66.3 OVERWEIGHT (BMI 25.0-29.9): ICD-10-CM

## 2021-06-28 DIAGNOSIS — Z13.1 SCREENING FOR DIABETES MELLITUS: ICD-10-CM

## 2021-06-28 DIAGNOSIS — J45.990 EXERCISE-INDUCED ASTHMA: ICD-10-CM

## 2021-06-28 PROBLEM — N12 PYELONEPHRITIS: Status: RESOLVED | Noted: 2020-09-15 | Resolved: 2021-06-28

## 2021-06-28 PROBLEM — A41.9 SEPSIS (HCC): Status: RESOLVED | Noted: 2020-09-16 | Resolved: 2021-06-28

## 2021-06-28 PROBLEM — N20.0 NEPHROLITHIASIS: Status: RESOLVED | Noted: 2020-08-31 | Resolved: 2021-06-28

## 2021-06-28 PROCEDURE — 99385 PREV VISIT NEW AGE 18-39: CPT | Performed by: NURSE PRACTITIONER

## 2021-06-28 PROCEDURE — 3725F SCREEN DEPRESSION PERFORMED: CPT | Performed by: NURSE PRACTITIONER

## 2021-06-28 RX ORDER — ALBUTEROL SULFATE 90 UG/1
2 AEROSOL, METERED RESPIRATORY (INHALATION) EVERY 6 HOURS PRN
Qty: 8 G | Refills: 1 | Status: SHIPPED | OUTPATIENT
Start: 2021-06-28 | End: 2022-04-20

## 2021-06-28 NOTE — PROGRESS NOTES
[address]    NAME: Prince Wesley  AGE: 28 y o  SEX: female  : 1990     DATE: 2021     Assessment and Plan:     Problem List Items Addressed This Visit        Respiratory    Exercise-induced asthma    Relevant Medications    albuterol (Ventolin HFA) 90 mcg/act inhaler    Other Relevant Orders    Comprehensive metabolic panel    CBC and differential    TSH, 3rd generation with Free T4 reflex    Rescue inhaler ordered for exercise induced asthma  Cardiovascular and Mediastinum    Migraine headache    Relevant Orders    Ambulatory referral to Neurology    Comprehensive metabolic panel    CBC and differential    TSH, 3rd generation with Free T4 reflex    Patient reports migraines with aura since she was 9years old  Patient reports that she has tried multiple medications in the past    Patient reports that she got a medical marijuana card for the migraines  Patient reports that she has not seen neurology  Lab work ordered  Patient referred to neurology for follow-up  Other    Annual physical exam - Primary    Relevant Orders    Comprehensive metabolic panel    CBC and differential    TSH, 3rd generation with Free T4 reflex    Lipid Panel with Direct LDL reflex    Overweight (BMI 25 0-29  9)    Relevant Orders    Comprehensive metabolic panel    CBC and differential    TSH, 3rd generation with Free T4 reflex    Lipid Panel with Direct LDL reflex    Patient instructed to eat a healthy low fat diet and to exercise on a regular basis  Screening for cardiovascular condition    Relevant Orders    Lipid Panel with Direct LDL reflex    Screening for diabetes mellitus    Relevant Orders    Comprehensive metabolic panel      Lab work ordered  Patient instructed to check with insurance for coverage of the lab work  Will follow-up results with the patient       Immunizations and preventive care screenings were discussed with patient today  Appropriate education was printed on patient's after visit summary  Counseling:  Alcohol/drug use: discussed moderation in alcohol intake, the recommendations for healthy alcohol use, and avoidance of illicit drug use  Dental Health: discussed importance of regular tooth brushing, flossing, and dental visits  Injury prevention: discussed safety/seat belts, safety helmets, smoke detectors, carbon monoxide detectors,   Sexual health: discussed sexually transmitted diseases, use of condoms, avoidance of unintended pregnancy, and contraceptive alternatives  · Exercise: the importance of regular exercise/physical activity was discussed  Recommend exercise 3-5 times per week for at least 30 minutes  BMI Counseling: Body mass index is 27 22 kg/m²  The BMI is above normal  Nutrition recommendations include encouraging healthy choices of fruits and vegetables, decreasing fast food intake, consuming healthier snacks, reducing intake of saturated and trans fat and reducing intake of cholesterol  Exercise recommendations include exercising 3-5 times per week  Tobacco Cessation Counseling: Tobacco cessation counseling was provided  The patient is sincerely urged to quit consumption of tobacco  She is not ready to quit tobacco        Return in 1 year (on 6/28/2022)  Chief Complaint:     Chief Complaint   Patient presents with    Annual Exam      History of Present Illness:     Adult Annual Physical   Patient here for a comprehensive physical exam      Patient reports migraines since she was 9years old  Patient reports that she got a medical marijuana card for migraines  Patient reports that she gets a migraine with aura every 3 weeks  Patient reports that sleep helps relieve the migraines  Patient reports that she gets nausea and vomiting with the migraines   Patient reports that she has tried multiple medications in the past  Patient reports that she has not seen a neurologist  Patient reports that she would like a refill on her albuterol inhaler  Patient reports that she has exercise induced asthma  Denies any wheezing, cough, Sob, or fever currently  Diet and Physical Activity  · Diet/Nutrition: frequent junk food  · Exercise: no formal exercise  Depression Screening  PHQ-9 Depression Screening    PHQ-9:   Frequency of the following problems over the past two weeks:      Little interest or pleasure in doing things: 1 - several days  Feeling down, depressed, or hopeless: 1 - several days  PHQ-2 Score: 2       General Health  · Sleep: gets 7-8 hours of sleep on average  · Hearing: normal - bilateral   · Vision: goes for regular eye exams and wears glasses and contacts  · Dental: no dental visits for >1 year and brushes teeth twice daily  /GYN Health  · Last menstrual period: June 21 2021  · History of STDs?: no      Review of Systems:     Review of Systems   Constitutional: Negative for appetite change, chills, fatigue and fever  HENT: Negative for congestion, ear pain, sinus pressure, sore throat and trouble swallowing  Eyes: Negative for pain, discharge and redness  Respiratory:        As noted in HPI  Cardiovascular: Negative for chest pain, palpitations and leg swelling  Gastrointestinal: Negative for abdominal pain, blood in stool, diarrhea, nausea and vomiting  Genitourinary: Negative for dysuria, frequency, hematuria and urgency  Musculoskeletal: Negative for myalgias and neck pain  Skin: Negative for rash  Neurological: Positive for headaches  Negative for dizziness, seizures, syncope, weakness, light-headedness and numbness  Psychiatric/Behavioral: Negative for suicidal ideas  The patient is not nervous/anxious                  Past Medical History:     Past Medical History:   Diagnosis Date    Allergic 2005    Anxiety     Asthma     Headache(784 0)     Kidney stone     Migraine     [facility]     Nephrolithiasis 2020    Panic anxiety syndrome     PONV (postoperative nausea and vomiting)     Pyelonephritis 9/15/2020    Sepsis ([facility] ) 2020    Suicide attempt ([facility] )     Varicella     childhood      Past Surgical History:     Past Surgical History:   Procedure Laterality Date    CYSTOSCOPY     [facility]ENTAL SURGERY N/A     [place] RETROGRADE PYELOGRAM  2020    [place] RETROGRADE PYELOGRAM  10/2/2020    [place] CYSTO/URETERO W/LITHOTRIPSY &INDWELL STENT INSRT Right 10/2/2020    Procedure: CYSTOSCOPY URETEROSCOPY WITH LITHOTRIPSY HOLMIUM LASER, RETROGRADE PYELOGRAM AND INSERTION STENT URETERAL;  Surgeon: Octavia Harrison MD;  [facility];  Service: Urology    [place] CYSTOURETHROSCOPY,URETER CATHETER Right 2020    Procedure: CYSTOSCOPY RETROGRADE PYELOGRAM WITH INSERTION STENT URETERAL;  Surgeon: Octavia Harrison MD;  [facility];  Service: Urology    SHOULDER SURGERY        Social History:     Social History     Socioeconomic History    Marital status:      Spouse name: None    Number of children: None    Years of education: None    Highest education level: None   Occupational History    None   Tobacco Use    Smoking status: Current Every Day Smoker     Types: Cigarettes     Last attempt to quit: 2017     Years since quittin 1    Smokeless tobacco: Never Used   Vaping Use    Vaping Use: Never used   Substance and Sexual Activity    Alcohol use: Yes     Comment: On occasion    Drug use: No    Sexual activity: Yes     Partners: Male     Birth control/protection: None   Other Topics Concern    None   Social History Narrative    None     Social Determinants of Health     Financial Resource Strain:     Difficulty of Paying Living Expenses:    Food Insecurity:     Worried About Running Out of Food in the Last Year:     Ran Out of Food in the Last Year:    Transportation Needs:     Lack of Transportation (Medical):      Lack of Transportation (Non-Medical):    Physical Activity:     Days of Exercise per Week:     Minutes of Exercise per Session:    Stress:     Feeling of Stress :    Social Connections:     Frequency of Communication with Friends and Family:     Frequency of Social Gatherings with Friends and Family:     Attends Temple Services:     Active Member of Clubs or Organizations:     Attends Club or Organization Meetings:     Marital Status:    Intimate Partner Violence:     Fear of Current or Ex-Partner:     Emotionally Abused:     Physically Abused:     Sexually Abused:       Family History:     Family History   Problem Relation Age of Onset    Alcohol abuse Father     Kidney disease Father     Asthma Maternal Grandmother     Cancer Maternal Grandmother     COPD Maternal Grandmother     Cancer Maternal Grandfather     Diabetes Paternal Grandmother     Alcohol abuse Paternal Grandfather     Cancer Paternal Grandfather       Current Medications:     Current Outpatient Medications   Medication Sig Dispense Refill    albuterol (Ventolin HFA) 90 mcg/act inhaler Inhale 2 puffs every 6 (six) hours as needed for wheezing or shortness of breath 8 g 1     No current facility-administered medications for this visit  Allergies: Allergies   Allergen Reactions    Pollen Extract       Physical Exam:     /80   Pulse 64   Resp 18   Ht 5' 4" (1 626 m)   Wt 71 9 kg (158 lb 9 6 oz)   LMP 06/21/2021   SpO2 99%   BMI 27 22 kg/m²     Physical Exam  Vitals reviewed  Constitutional:       General: She is not in acute distress  Appearance: She is not ill-appearing or diaphoretic  HENT:      Right Ear: Tympanic membrane, ear canal and external ear normal       Left Ear: Tympanic membrane, ear canal and external ear normal       Nose: Nose normal       Mouth/Throat:      Mouth: Mucous membranes are moist       Pharynx: Oropharynx is clear  No posterior oropharyngeal erythema     Eyes:      Conjunctiva/sclera: Conjunctivae normal       Pupils: Pupils are equal, round, and reactive to light  Cardiovascular:      Rate and Rhythm: Normal rate and regular rhythm  Pulses: Normal pulses  Heart sounds: Normal heart sounds  Comments: No edema noted  Pulmonary:      Effort: Pulmonary effort is normal  No respiratory distress  Breath sounds: Normal breath sounds  No wheezing  Abdominal:      General: There is no distension  Palpations: Abdomen is soft  Tenderness: There is no abdominal tenderness  Musculoskeletal:         General: Normal range of motion  Cervical back: Normal range of motion  Comments: Gait wnl  Lymphadenopathy:      Cervical: No cervical adenopathy  Skin:     Findings: No rash  Neurological:      Mental Status: She is alert and oriented to person, place, and time  Cranial Nerves: No cranial nerve deficit        Coordination: Coordination normal    Psychiatric:         Mood and Affect: Mood normal           [address]

## 2021-06-28 NOTE — PATIENT INSTRUCTIONS

## 2021-07-06 ENCOUNTER — ANNUAL EXAM (OUTPATIENT)
Dept: OBGYN CLINIC | Facility: CLINIC | Age: 31
End: 2021-07-06
Payer: COMMERCIAL

## 2021-07-06 VITALS
BODY MASS INDEX: 26.98 KG/M2 | DIASTOLIC BLOOD PRESSURE: 88 MMHG | SYSTOLIC BLOOD PRESSURE: 118 MMHG | WEIGHT: 158 LBS | HEIGHT: 64 IN

## 2021-07-06 DIAGNOSIS — Z12.4 SCREENING FOR MALIGNANT NEOPLASM OF CERVIX: ICD-10-CM

## 2021-07-06 DIAGNOSIS — Z11.51 SCREENING FOR HUMAN PAPILLOMAVIRUS (HPV): ICD-10-CM

## 2021-07-06 DIAGNOSIS — Z11.3 SCREENING FOR STD (SEXUALLY TRANSMITTED DISEASE): ICD-10-CM

## 2021-07-06 DIAGNOSIS — Z01.419 WELL WOMAN EXAM WITH ROUTINE GYNECOLOGICAL EXAM: Primary | ICD-10-CM

## 2021-07-06 PROCEDURE — 87491 CHLMYD TRACH DNA AMP PROBE: CPT | Performed by: PHYSICIAN ASSISTANT

## 2021-07-06 PROCEDURE — 87591 N.GONORRHOEAE DNA AMP PROB: CPT | Performed by: PHYSICIAN ASSISTANT

## 2021-07-06 PROCEDURE — 99395 PREV VISIT EST AGE 18-39: CPT | Performed by: PHYSICIAN ASSISTANT

## 2021-07-06 PROCEDURE — G0476 HPV COMBO ASSAY CA SCREEN: HCPCS | Performed by: PHYSICIAN ASSISTANT

## 2021-07-06 PROCEDURE — G0145 SCR C/V CYTO,THINLAYER,RESCR: HCPCS | Performed by: PHYSICIAN ASSISTANT

## 2021-07-06 PROCEDURE — 3008F BODY MASS INDEX DOCD: CPT | Performed by: PHYSICIAN ASSISTANT

## 2021-07-06 PROCEDURE — 4004F PT TOBACCO SCREEN RCVD TLK: CPT | Performed by: PHYSICIAN ASSISTANT

## 2021-07-06 NOTE — PROGRESS NOTES
ASSESSMENT & PLAN: Jono Butterfield is a 28 y o  Sirisha Xaviertner with normal gynecologic exam     1   Routine well woman exam done today  2    Pap and HPV:Pap with HPV was done today  Current ASCCP Guidelines reviewed  Last Pap  2015 :  no abnormalities  3   The patient accepted STD testing  chlamydia, gonorrhea and HPV testing performed  Safe sex practices have been discussed  4  The patient is sexually active  She declined contraception at this time  5  The following were reviewed in today's visit: breast self exam, STD testing, exercise and healthy diet, smoking cessation  6  Patient to return to office in 12 months for annual      All questions have been answered to her satisfaction  CC:  Annual Gynecologic Examination    HPI: Jono Butterfield is a 28 y o  Sirisha Xaviertner who presents for annual gynecologic examination  She is feeling well at this time and has no complaints  Patient has a history of anxiety but states she feels stable at this time  She denies SI/HI  Health Maintenance:    She exercises 1 days per week  Walking, cardio, core workouts  She does perform regular monthly self breast exams  She feels safe at home  Patients does not follow a well balanced diet        Past Medical History:   Diagnosis Date    Allergic 2005    Anxiety     Asthma     Headache(784 0)     Kidney stone     Migraine     Nephrolithiasis 2020    Panic anxiety syndrome     PONV (postoperative nausea and vomiting)     Pyelonephritis 9/15/2020    Sepsis ([address] ) 2020    Suicide attempt ([address] )     after her grandmother , attempted OD on xanax    Varicella     childhood       Past Surgical History:   Procedure Laterality Date    [facility]     [facility] N/A     [place] RETROGRADE PYELOGRAM  2020    [place] RETROGRADE PYELOGRAM  10/2/2020    KIDNEY STONE SURGERY  10/2021    [place] CYSTO/URETERO W/LITHOTRIPSY &INDWELL STENT INSRT Right 10/2/2020    Procedure: CYSTOSCOPY URETEROSCOPY WITH LITHOTRIPSY HOLMIUM LASER, RETROGRADE PYELOGRAM AND INSERTION STENT URETERAL;  Surgeon: Erika Georges MD;  [facility];  Service: Urology    [place] CYSTOURETHROSCOPY,URETER CATHETER Right 2020    Procedure: CYSTOSCOPY RETROGRADE PYELOGRAM WITH INSERTION STENT URETERAL;  Surgeon: Erika Georges MD;  [facility];  Service: Urology    SHOULDER SURGERY         Past OB/Gyn History:  Period Cycle (Days):  (30-34)  Period Duration (Days): 5  Period Pattern: Regular  Menstrual Flow: Moderate  Dysmenorrhea: (!) Mild  Dysmenorrhea Symptoms: CrampingPatient's last menstrual period was 2021  Menstrual History:  OB History        1    Para   1    Term   1            AB        Living   1       SAB        TAB        Ectopic        Multiple   0    Live Births   1                Menarche age: 9  Patient's last menstrual period was 2021  Period Cycle (Days):  (30-34)  Period Duration (Days): 5  Period Pattern: Regular  Menstrual Flow: Moderate  Dysmenorrhea: (!) Mild  Dysmenorrhea Symptoms: Cramping    History of sexually transmitted infection No  Patient is currently sexually active  heterosexual and  monogamous  years Birth control: none  Last Pap  :  no abnormalities      Family History   Problem Relation Age of Onset    Alcohol abuse Father     Kidney disease Father     Asthma Maternal Grandmother     Cancer Maternal Grandmother     COPD Maternal Grandmother     Cancer Maternal Grandfather     Diabetes Paternal Grandmother     Alcohol abuse Paternal Grandfather     Cancer Paternal Grandfather     Obesity Mother     Obesity Sister     No Known Problems Brother     Autism spectrum disorder Half-Brother        Social History:  Social History     Socioeconomic History    Marital status:      Spouse name: Not on file    Number of children: Not on file    Years of education: Not on file    Highest education level: Not on file   Occupational History    Not on file   Tobacco Use    Smoking status: Current Every Day Smoker     Packs/day: 0 25     Years: 3 00     Pack years: 0 75     Types: Cigarettes    Smokeless tobacco: Never Used   Vaping Use    Vaping Use: Never used   Substance and Sexual Activity    Alcohol use: Yes     Alcohol/week: 1 0 standard drinks     Types: 1 Shots of liquor per week     Comment: <1x/week    Drug use: Yes     Types: Marijuana     Comment: has medical card for marijuana for migraines and anxiety    Sexual activity: Yes     Partners: Male     Birth control/protection: None   Other Topics Concern    Not on file   Social History Narrative    Not on file     Social Determinants of Health     Financial Resource Strain:     Difficulty of Paying Living Expenses:    Food Insecurity: No Food Insecurity    Worried About Running Out of Food in the Last Year: Never true    Shira of Food in the Last Year: Never true   Transportation Needs: No Transportation Needs    Lack of Transportation (Medical): No    Lack of Transportation (Non-Medical): No   Physical Activity: Insufficiently Active    Days of Exercise per Week: 1 day    Minutes of Exercise per Session: 30 min   Stress:     Feeling of Stress :    Social Connections:     Frequency of Communication with Friends and Family:     Frequency of Social Gatherings with Friends and Family:     Attends Restorationist Services:     Active Member of Clubs or Organizations:     Attends Club or Organization Meetings:     Marital Status:    Intimate Partner Violence:     Fear of Current or Ex-Partner:     Emotionally Abused:     Physically Abused:     Sexually Abused:      Presently lives with boyfriend and daughter  Patient is single    Patient is currently employed kitchen [employer]    Allergies   Allergen Reactions    Pollen Extract        Current Outpatient Medications:     albuterol (Ventolin HFA) 90 mcg/act inhaler, Inhale 2 puffs every 6 (six) hours as needed for wheezing or shortness of breath, Disp: 8 g, Rfl: 1    Review of Systems:  Review of Systems   Constitutional: Negative for chills, fever and unexpected weight change  Respiratory: Negative for shortness of breath  Cardiovascular: Negative for chest pain  Gastrointestinal: Negative for abdominal pain, diarrhea, nausea and vomiting  Skin: Negative for rash  Psychiatric/Behavioral: Negative for dysphoric mood  The patient is not nervous/anxious  Physical Exam:  /88   Ht 5' 4" (1 626 m)   Wt 71 7 kg (158 lb)   LMP 06/21/2021   BMI 27 12 kg/m²    Physical Exam  Constitutional:       General: She is not in acute distress  HENT:      Head: Normocephalic and atraumatic  Neck:      Thyroid: No thyroid mass or thyromegaly  Cardiovascular:      Rate and Rhythm: Normal rate and regular rhythm  Pulmonary:      Effort: Pulmonary effort is normal       Breath sounds: Normal breath sounds  Chest:      Breasts: Breasts are symmetrical          Right: Normal  No mass, skin change or tenderness  Left: Normal  No mass, skin change or tenderness  Abdominal:      General: There is no distension  Palpations: Abdomen is soft  Tenderness: There is no abdominal tenderness  Genitourinary:     General: Normal vulva  Exam position: Lithotomy position  Pubic Area: No rash  Labia:         Right: No rash or lesion  Left: No rash or lesion  Vagina: Normal  No vaginal discharge or lesions  Cervix: No cervical motion tenderness, discharge or lesion  Uterus: Normal        Adnexa:         Right: No mass  Left: No mass  Lymphadenopathy:      Upper Body:      Right upper body: No axillary adenopathy  Left upper body: No axillary adenopathy  Skin:     General: Skin is warm and dry  Findings: No lesion or rash  Neurological:      Mental Status: She is alert     Psychiatric:         Mood and Affect: Mood normal          Speech: Speech normal  Behavior: Behavior normal

## 2021-07-08 LAB
C TRACH DNA SPEC QL NAA+PROBE: NEGATIVE
N GONORRHOEA DNA SPEC QL NAA+PROBE: NEGATIVE

## 2021-07-09 LAB
HPV HR 12 DNA CVX QL NAA+PROBE: NEGATIVE
HPV16 DNA CVX QL NAA+PROBE: NEGATIVE
HPV18 DNA CVX QL NAA+PROBE: NEGATIVE

## 2021-07-14 LAB
LAB AP GYN PRIMARY INTERPRETATION: NORMAL
Lab: NORMAL
PATH INTERP SPEC-IMP: NORMAL

## 2022-04-20 DIAGNOSIS — J45.990 EXERCISE-INDUCED ASTHMA: ICD-10-CM

## 2022-04-20 RX ORDER — ALBUTEROL SULFATE 90 UG/1
AEROSOL, METERED RESPIRATORY (INHALATION)
Qty: 6.7 G | Refills: 1 | Status: SHIPPED | OUTPATIENT
Start: 2022-04-20

## 2022-10-12 PROBLEM — Z13.1 SCREENING FOR DIABETES MELLITUS: Status: RESOLVED | Noted: 2021-06-28 | Resolved: 2022-10-12

## 2022-10-12 PROBLEM — Z13.6 SCREENING FOR CARDIOVASCULAR CONDITION: Status: RESOLVED | Noted: 2021-06-28 | Resolved: 2022-10-12

## 2024-10-21 DIAGNOSIS — Z00.6 ENCOUNTER FOR EXAMINATION FOR NORMAL COMPARISON OR CONTROL IN CLINICAL RESEARCH PROGRAM: ICD-10-CM

## 2025-07-10 ENCOUNTER — OFFICE VISIT (OUTPATIENT)
Dept: FAMILY MEDICINE CLINIC | Facility: CLINIC | Age: 35
End: 2025-07-10
Payer: COMMERCIAL

## 2025-07-10 VITALS
WEIGHT: 124 LBS | OXYGEN SATURATION: 99 % | SYSTOLIC BLOOD PRESSURE: 124 MMHG | HEIGHT: 64 IN | TEMPERATURE: 97.7 F | BODY MASS INDEX: 21.17 KG/M2 | HEART RATE: 85 BPM | DIASTOLIC BLOOD PRESSURE: 78 MMHG | RESPIRATION RATE: 18 BRPM

## 2025-07-10 DIAGNOSIS — G43.909 MIGRAINE WITHOUT STATUS MIGRAINOSUS, NOT INTRACTABLE, UNSPECIFIED MIGRAINE TYPE: ICD-10-CM

## 2025-07-10 DIAGNOSIS — J45.990 EXERCISE-INDUCED ASTHMA: ICD-10-CM

## 2025-07-10 DIAGNOSIS — Z00.00 ANNUAL PHYSICAL EXAM: Primary | ICD-10-CM

## 2025-07-10 DIAGNOSIS — Z13.220 SCREENING FOR LIPID DISORDERS: ICD-10-CM

## 2025-07-10 PROBLEM — IMO0001 SMOKING: Status: RESOLVED | Noted: 2020-08-31 | Resolved: 2025-07-10

## 2025-07-10 PROCEDURE — 99385 PREV VISIT NEW AGE 18-39: CPT | Performed by: FAMILY MEDICINE

## 2025-07-10 RX ORDER — ALBUTEROL SULFATE 90 UG/1
2 INHALANT RESPIRATORY (INHALATION) EVERY 6 HOURS PRN
Qty: 6.7 G | Refills: 3 | Status: SHIPPED | OUTPATIENT
Start: 2025-07-10

## 2025-07-10 NOTE — PROGRESS NOTES
Adult Annual Physical  Name: Barbie Bennett      : 1990      MRN: [id number]  Encounter Provider: Bushra Redmond MD  Encounter Date: 7/10/2025   Encounter department: [facility]    :  Assessment & Plan  Annual physical exam         Migraine without status migrainosus, not intractable, unspecified migraine type      Was on propranolol during high school for migraines.  Now controlled off medications.    Orders:    CBC and differential; Future    Comprehensive metabolic panel; Future    TSH, 3rd generation with Free T4 reflex; Future    Exercise-induced asthma    Orders:    albuterol (PROVENTIL HFA,VENTOLIN HFA) 90 mcg/act inhaler; Inhale 2 puffs every 6 (six) hours as needed for wheezing    Screening for lipid disorders    Orders:    Lipid panel; Future        Preventive Screenings:    - HIV screening: screening up-to-date   - Lung cancer screening: screening not indicated     Immunizations:  - Immunizations due: Prevnar 20 and Hepatitis A      Depression Screening and Follow-up Plan: Patient was screened for depression during today's encounter. They screened negative with a PHQ-2 score of 0.      Tobacco Cessation Counseling: The patient is sincerely urged to quit consumption of tobacco. She is not ready to quit tobacco.         History of Present Illness     Adult Annual Physical:  Patient presents for annual physical.     Diet and Physical Activity:  - Diet/Nutrition: no special diet.  - Exercise: walking, moderate cardiovascular exercise, 1-2 times a week on average and 30-60 minutes on average.    Depression Screening:  - PHQ-2 Score: 0    General Health:  - Sleep: 7-8 hours of sleep on average.  - Hearing: normal hearing right ear, normal hearing left ear and normal hearing bilateral ears.  - Vision: most recent eye exam > 1 year ago and wears glasses and contacts.  - Dental: no dental visits for > 1 year, brushes teeth twice daily and floss regularly.    /GYN Health:  - Follows  "with GYN: yes.   - Menopause: premenopausal.   - Last menstrual cycle: 7/8/2025.   - History of STDs: no    Advanced Care Planning:  - Has an advanced directive?: no    - Has a durable medical POA?: no      Review of Systems   Constitutional:  Negative for activity change, fatigue and fever.   Eyes:  Negative for visual disturbance.   Respiratory:  Negative for shortness of breath.    Cardiovascular:  Negative for chest pain.   Gastrointestinal:  Negative for abdominal pain, constipation, diarrhea and nausea.   Endocrine: Negative for cold intolerance and heat intolerance.   Musculoskeletal:  Negative for back pain.   Skin:  Negative for rash.   Neurological:  Negative for headaches.   Psychiatric/Behavioral:  Negative for confusion.          Objective   /78 (BP Location: Left arm, Patient Position: Sitting, Cuff Size: Standard)   Pulse 85   Temp 97.7 °F (36.5 °C) (Temporal)   Resp 18   Ht 5' 4\" (1.626 m)   Wt 56.2 kg (124 lb)   LMP 07/08/2025   SpO2 99%   BMI 21.28 kg/m²     Physical Exam  Vitals and nursing note reviewed.   Constitutional:       Appearance: Normal appearance. She is well-developed.   HENT:      Head: Normocephalic and atraumatic.     Cardiovascular:      Rate and Rhythm: Normal rate and regular rhythm.   Pulmonary:      Effort: Pulmonary effort is normal.      Breath sounds: Normal breath sounds.   Abdominal:      General: Bowel sounds are normal.      Palpations: Abdomen is soft.     Musculoskeletal:      Cervical back: Normal range of motion.     Skin:     General: Skin is warm.     Neurological:      General: No focal deficit present.      Mental Status: She is alert.     Psychiatric:         Mood and Affect: Mood normal.         Speech: Speech normal.         "

## 2025-07-10 NOTE — ASSESSMENT & PLAN NOTE
Was on propranolol during high school for migraines.  Now controlled off medications.    Orders:    CBC and differential; Future    Comprehensive metabolic panel; Future    TSH, 3rd generation with Free T4 reflex; Future

## 2025-07-15 ENCOUNTER — APPOINTMENT (OUTPATIENT)
Dept: LAB | Facility: CLINIC | Age: 35
End: 2025-07-15
Payer: COMMERCIAL

## 2025-07-15 DIAGNOSIS — Z13.220 SCREENING FOR LIPID DISORDERS: ICD-10-CM

## 2025-07-15 DIAGNOSIS — G43.909 MIGRAINE WITHOUT STATUS MIGRAINOSUS, NOT INTRACTABLE, UNSPECIFIED MIGRAINE TYPE: ICD-10-CM

## 2025-07-15 LAB
ALBUMIN SERPL BCG-MCNC: 4.2 G/DL (ref 3.5–5)
ALP SERPL-CCNC: 53 U/L (ref 34–104)
ALT SERPL W P-5'-P-CCNC: 7 U/L (ref 7–52)
ANION GAP SERPL CALCULATED.3IONS-SCNC: 9 MMOL/L (ref 4–13)
AST SERPL W P-5'-P-CCNC: 19 U/L (ref 13–39)
BASOPHILS # BLD AUTO: 0.03 THOUSANDS/ÂΜL (ref 0–0.1)
BASOPHILS NFR BLD AUTO: 0 % (ref 0–1)
BILIRUB SERPL-MCNC: 0.53 MG/DL (ref 0.2–1)
BUN SERPL-MCNC: 6 MG/DL (ref 5–25)
CALCIUM SERPL-MCNC: 8.9 MG/DL (ref 8.4–10.2)
CHLORIDE SERPL-SCNC: 104 MMOL/L (ref 96–108)
CHOLEST SERPL-MCNC: 181 MG/DL (ref ?–200)
CO2 SERPL-SCNC: 26 MMOL/L (ref 21–32)
CREAT SERPL-MCNC: 0.68 MG/DL (ref 0.6–1.3)
EOSINOPHIL # BLD AUTO: 0.03 THOUSAND/ÂΜL (ref 0–0.61)
EOSINOPHIL NFR BLD AUTO: 0 % (ref 0–6)
ERYTHROCYTE [DISTWIDTH] IN BLOOD BY AUTOMATED COUNT: 11.9 % (ref 11.6–15.1)
GFR SERPL CREATININE-BSD FRML MDRD: 114 ML/MIN/1.73SQ M
GLUCOSE P FAST SERPL-MCNC: 101 MG/DL (ref 65–99)
HCT VFR BLD AUTO: 45.3 % (ref 34.8–46.1)
HDLC SERPL-MCNC: 63 MG/DL
HGB BLD-MCNC: 15.4 G/DL (ref 11.5–15.4)
IMM GRANULOCYTES # BLD AUTO: 0.02 THOUSAND/UL (ref 0–0.2)
IMM GRANULOCYTES NFR BLD AUTO: 0 % (ref 0–2)
LDLC SERPL CALC-MCNC: 99 MG/DL (ref 0–100)
LYMPHOCYTES # BLD AUTO: 1.07 THOUSANDS/ÂΜL (ref 0.6–4.47)
LYMPHOCYTES NFR BLD AUTO: 15 % (ref 14–44)
MCH RBC QN AUTO: 32.5 PG (ref 26.8–34.3)
MCHC RBC AUTO-ENTMCNC: 34 G/DL (ref 31.4–37.4)
MCV RBC AUTO: 96 FL (ref 82–98)
MONOCYTES # BLD AUTO: 0.59 THOUSAND/ÂΜL (ref 0.17–1.22)
MONOCYTES NFR BLD AUTO: 8 % (ref 4–12)
NEUTROPHILS # BLD AUTO: 5.4 THOUSANDS/ÂΜL (ref 1.85–7.62)
NEUTS SEG NFR BLD AUTO: 77 % (ref 43–75)
NONHDLC SERPL-MCNC: 118 MG/DL
NRBC BLD AUTO-RTO: 0 /100 WBCS
PLATELET # BLD AUTO: 177 THOUSANDS/UL (ref 149–390)
PMV BLD AUTO: 11.7 FL (ref 8.9–12.7)
POTASSIUM SERPL-SCNC: 3.8 MMOL/L (ref 3.5–5.3)
PROT SERPL-MCNC: 6.7 G/DL (ref 6.4–8.4)
RBC # BLD AUTO: 4.74 MILLION/UL (ref 3.81–5.12)
SODIUM SERPL-SCNC: 139 MMOL/L (ref 135–147)
TRIGL SERPL-MCNC: 93 MG/DL (ref ?–150)
TSH SERPL DL<=0.05 MIU/L-ACNC: 1.58 UIU/ML (ref 0.45–4.5)
WBC # BLD AUTO: 7.14 THOUSAND/UL (ref 4.31–10.16)

## 2025-07-15 PROCEDURE — 80061 LIPID PANEL: CPT

## 2025-07-15 PROCEDURE — 80053 COMPREHEN METABOLIC PANEL: CPT

## 2025-07-15 PROCEDURE — 85025 COMPLETE CBC W/AUTO DIFF WBC: CPT

## 2025-07-15 PROCEDURE — 84443 ASSAY THYROID STIM HORMONE: CPT

## 2025-07-15 PROCEDURE — 36415 COLL VENOUS BLD VENIPUNCTURE: CPT

## 2025-07-18 ENCOUNTER — OFFICE VISIT (OUTPATIENT)
Dept: OBGYN CLINIC | Facility: CLINIC | Age: 35
End: 2025-07-18
Payer: COMMERCIAL

## 2025-07-18 VITALS
WEIGHT: 145 LBS | SYSTOLIC BLOOD PRESSURE: 108 MMHG | HEIGHT: 64 IN | DIASTOLIC BLOOD PRESSURE: 72 MMHG | BODY MASS INDEX: 24.75 KG/M2

## 2025-07-18 DIAGNOSIS — Z80.41 FAMILY HISTORY OF OVARIAN CANCER: ICD-10-CM

## 2025-07-18 DIAGNOSIS — Z01.419 ENCOUNTER FOR GYNECOLOGICAL EXAMINATION WITHOUT ABNORMAL FINDING: Primary | ICD-10-CM

## 2025-07-18 PROBLEM — E66.3 OVERWEIGHT (BMI 25.0-29.9): Status: RESOLVED | Noted: 2021-06-28 | Resolved: 2025-07-18

## 2025-07-18 PROBLEM — Z00.00 ANNUAL PHYSICAL EXAM: Status: RESOLVED | Noted: 2021-06-28 | Resolved: 2025-07-18

## 2025-07-18 PROCEDURE — G0476 HPV COMBO ASSAY CA SCREEN: HCPCS | Performed by: PHYSICIAN ASSISTANT

## 2025-07-18 PROCEDURE — G0145 SCR C/V CYTO,THINLAYER,RESCR: HCPCS | Performed by: PHYSICIAN ASSISTANT

## 2025-07-18 PROCEDURE — S0610 ANNUAL GYNECOLOGICAL EXAMINA: HCPCS | Performed by: PHYSICIAN ASSISTANT

## 2025-07-18 NOTE — PROGRESS NOTES
Name: Barbie Bennett      : 1990      MRN: [id number]  Encounter Provider: Sarah Steiner PA-C  Encounter Date: 2025   Encounter department: [facility] [place] [facility]  :  Assessment & Plan  Encounter for gynecological examination without abnormal finding    Orders:  •  Liquid-based pap, screening    Family history of ovarian cancer    Orders:  •  Ambulatory Referral to Genetics; Future    Pap done.  Referral to genetic counselor placed secondary to family history of ovarian cancer.  Will f/u with [facility] for further fertility work-up.  If no problems, patient to return in 1 year for routine gyn care.    History of Present Illness   Patient is here for yearly gyn exam.  She is new to our office today; last gyn visit in .  States she is doing well overall.  Periods are regular every month, and bleeding lasts for 5 days.  She denies heavy bleeding and severe cramping.  Patient and her  have been trying to conceive for 15 mos without success; had no issues conceiving her 8 yo.  Denies bowel/bladder changes, pelvic pain, bloating, abdominal pain, n/v, change in appetite, and thyroid disease.    Patient is performing self-breast exam.  Denies new masses, skin changes, nipple discharge, and pain/tenderness.  Family history of ovarian cancer in MGM in her 60s.    Barbie Bennett is a 35 y.o. female who presents for yearly gyn exam.  History obtained from: patient    Review of Systems   Constitutional:  Negative for appetite change and unexpected weight change.   Cardiovascular:         No masses, skin changes, nipple discharge, and pain/tenderness.   Gastrointestinal:  Negative for abdominal distention, abdominal pain, constipation, diarrhea, nausea and vomiting.   Genitourinary:  Negative for difficulty urinating, dysuria, frequency, genital sores, hematuria, menstrual problem, pelvic pain, urgency, vaginal bleeding, vaginal discharge and vaginal pain.          Objective   /72 (BP Location:  "Left arm, Patient Position: Sitting, Cuff Size: Adult)   Ht 5' 4\" (1.626 m)   Wt 65.8 kg (145 lb)   LMP 07/08/2025   BMI 24.89 kg/m²      Physical Exam  Vitals reviewed. Exam conducted with a chaperone present.   Constitutional:       Appearance: Normal appearance. She is well-developed and normal weight.   Neck:      Thyroid: No thyromegaly.   Pulmonary:      Effort: Pulmonary effort is normal.   Chest:   Breasts:     Breasts are symmetrical.      Right: Normal. No swelling, bleeding, inverted nipple, mass, nipple discharge, skin change or tenderness.      Left: Normal. No swelling, bleeding, inverted nipple, mass, nipple discharge, skin change or tenderness.   Abdominal:      General: There is no distension.      Palpations: Abdomen is soft.      Tenderness: There is no abdominal tenderness.   Genitourinary:     General: Normal vulva.      Pubic Area: No rash.       Labia:         Right: No rash, tenderness, lesion or injury.         Left: No rash, tenderness, lesion or injury.       Vagina: Normal. No vaginal discharge, erythema, tenderness or bleeding.      Cervix: Normal.      Uterus: Normal.       Adnexa: Right adnexa normal and left adnexa normal.        Right: No mass, tenderness or fullness.          Left: No mass, tenderness or fullness.       Musculoskeletal:      Cervical back: Neck supple.   Lymphadenopathy:      Cervical: No cervical adenopathy.      Upper Body:      Right upper body: No supraclavicular or axillary adenopathy.      Left upper body: No supraclavicular or axillary adenopathy.      Lower Body: No right inguinal adenopathy. No left inguinal adenopathy.     Skin:     General: Skin is warm and dry.     Neurological:      Mental Status: She is alert and oriented to person, place, and time.     Psychiatric:         Behavior: Behavior normal. Behavior is cooperative.         Thought Content: Thought content normal.         Judgment: Judgment normal.         "

## 2025-07-23 ENCOUNTER — RESULTS FOLLOW-UP (OUTPATIENT)
Dept: OBGYN CLINIC | Facility: CLINIC | Age: 35
End: 2025-07-23

## 2025-07-23 DIAGNOSIS — N76.0 BV (BACTERIAL VAGINOSIS): Primary | ICD-10-CM

## 2025-07-23 DIAGNOSIS — B96.89 BV (BACTERIAL VAGINOSIS): Primary | ICD-10-CM

## 2025-07-23 LAB
LAB AP GYN PRIMARY INTERPRETATION: NORMAL
Lab: NORMAL
PATH INTERP SPEC-IMP: NORMAL

## 2025-07-23 RX ORDER — METRONIDAZOLE 500 MG/1
500 TABLET ORAL EVERY 12 HOURS SCHEDULED
Qty: 14 TABLET | Refills: 0 | Status: SHIPPED | OUTPATIENT
Start: 2025-07-23 | End: 2025-07-30

## 2025-07-23 NOTE — TELEPHONE ENCOUNTER
Spoke with patient regarding Pap results - cytology negative with presence of BV.  Rx for metronidazole 500 mg BID x 7 days sent to pharmacy.  Instructed patient to avoid alcohol while on abx.  Call with problems.

## (undated) DEVICE — CATH URET .038 10FR 50CM DUAL LUMEN

## (undated) DEVICE — GUIDEWIRE STRGHT TIP 0.035 IN  SOLO PLUS

## (undated) DEVICE — CATH URETERAL 5FR X 70 CM FLEX TIP POLYUR BARD

## (undated) DEVICE — 3M™ TEGADERM™ TRANSPARENT FILM DRESSING FRAME STYLE, 1624W, 2-3/8 IN X 2-3/4 IN (6 CM X 7 CM), 100/CT 4CT/CASE: Brand: 3M™ TEGADERM™

## (undated) DEVICE — SHEATH URETERAL ACCESS 12/14FR 35CM PROXIS

## (undated) DEVICE — SYRINGE 10ML LL

## (undated) DEVICE — ENDOSCOPIC VALVE WITH ADAPTER.: Brand: SURSEAL® II

## (undated) DEVICE — GLOVE SRG BIOGEL ORTHOPEDIC 7.5

## (undated) DEVICE — PACK TUR

## (undated) DEVICE — STERILE SURGICAL LUBRICANT,  TUBE: Brand: SURGILUBE

## (undated) DEVICE — SPECIMEN CONTAINER STERILE PEEL PACK

## (undated) DEVICE — UROCATCH BAG

## (undated) DEVICE — BASKET SPECIMEN RETRIVAL 1.9FR 120CM